# Patient Record
Sex: FEMALE | Race: WHITE | NOT HISPANIC OR LATINO | URBAN - NONMETROPOLITAN AREA
[De-identification: names, ages, dates, MRNs, and addresses within clinical notes are randomized per-mention and may not be internally consistent; named-entity substitution may affect disease eponyms.]

---

## 2017-01-04 ENCOUNTER — AMBULATORY - GICH (OUTPATIENT)
Dept: SCHEDULING | Facility: OTHER | Age: 37
End: 2017-01-04

## 2017-04-03 ENCOUNTER — COMMUNICATION - GICH (OUTPATIENT)
Dept: FAMILY MEDICINE | Facility: OTHER | Age: 37
End: 2017-04-03

## 2017-04-03 DIAGNOSIS — Z30.09 ENCOUNTER FOR OTHER GENERAL COUNSELING AND ADVICE ON CONTRACEPTION: ICD-10-CM

## 2017-04-06 ENCOUNTER — OFFICE VISIT - GICH (OUTPATIENT)
Dept: FAMILY MEDICINE | Facility: OTHER | Age: 37
End: 2017-04-06

## 2017-04-06 ENCOUNTER — HISTORY (OUTPATIENT)
Dept: FAMILY MEDICINE | Facility: OTHER | Age: 37
End: 2017-04-06

## 2017-04-06 DIAGNOSIS — F98.8 OTHER SPECIFIED BEHAVIORAL AND EMOTIONAL DISORDERS WITH ONSET USUALLY OCCURRING IN CHILDHOOD AND ADOLESCENCE: ICD-10-CM

## 2017-04-06 DIAGNOSIS — F43.9 REACTION TO SEVERE STRESS: ICD-10-CM

## 2017-04-06 ASSESSMENT — ANXIETY QUESTIONNAIRES
4. TROUBLE RELAXING: MORE THAN HALF THE DAYS
6. BECOMING EASILY ANNOYED OR IRRITABLE: NOT AT ALL
3. WORRYING TOO MUCH ABOUT DIFFERENT THINGS: NEARLY EVERY DAY
2. NOT BEING ABLE TO STOP OR CONTROL WORRYING: MORE THAN HALF THE DAYS
GAD7 TOTAL SCORE: 12
1. FEELING NERVOUS, ANXIOUS, OR ON EDGE: NEARLY EVERY DAY
5. BEING SO RESTLESS THAT IT IS HARD TO SIT STILL: NOT AT ALL
7. FEELING AFRAID AS IF SOMETHING AWFUL MIGHT HAPPEN: MORE THAN HALF THE DAYS

## 2017-05-11 ENCOUNTER — COMMUNICATION - GICH (OUTPATIENT)
Dept: FAMILY MEDICINE | Facility: OTHER | Age: 37
End: 2017-05-11

## 2017-05-11 DIAGNOSIS — F43.9 REACTION TO SEVERE STRESS: ICD-10-CM

## 2017-07-06 ENCOUNTER — OFFICE VISIT - GICH (OUTPATIENT)
Dept: FAMILY MEDICINE | Facility: OTHER | Age: 37
End: 2017-07-06

## 2017-07-06 ENCOUNTER — HISTORY (OUTPATIENT)
Dept: FAMILY MEDICINE | Facility: OTHER | Age: 37
End: 2017-07-06

## 2017-07-06 DIAGNOSIS — F43.9 REACTION TO SEVERE STRESS: ICD-10-CM

## 2017-07-06 DIAGNOSIS — F98.8 OTHER SPECIFIED BEHAVIORAL AND EMOTIONAL DISORDERS WITH ONSET USUALLY OCCURRING IN CHILDHOOD AND ADOLESCENCE: ICD-10-CM

## 2017-07-28 ENCOUNTER — OFFICE VISIT - GICH (OUTPATIENT)
Dept: FAMILY MEDICINE | Facility: OTHER | Age: 37
End: 2017-07-28

## 2017-07-28 ENCOUNTER — COMMUNICATION - GICH (OUTPATIENT)
Dept: FAMILY MEDICINE | Facility: OTHER | Age: 37
End: 2017-07-28

## 2017-07-28 DIAGNOSIS — K52.9 NONINFECTIVE GASTROENTERITIS AND COLITIS: ICD-10-CM

## 2017-08-01 ENCOUNTER — COMMUNICATION - GICH (OUTPATIENT)
Dept: FAMILY MEDICINE | Facility: OTHER | Age: 37
End: 2017-08-01

## 2017-08-04 ENCOUNTER — OFFICE VISIT - GICH (OUTPATIENT)
Dept: FAMILY MEDICINE | Facility: OTHER | Age: 37
End: 2017-08-04

## 2017-08-04 ENCOUNTER — AMBULATORY - GICH (OUTPATIENT)
Dept: SCHEDULING | Facility: OTHER | Age: 37
End: 2017-08-04

## 2017-08-04 DIAGNOSIS — F41.8 OTHER SPECIFIED ANXIETY DISORDERS: ICD-10-CM

## 2017-08-04 DIAGNOSIS — R19.7 DIARRHEA: ICD-10-CM

## 2017-08-04 DIAGNOSIS — R10.9 ABDOMINAL PAIN: ICD-10-CM

## 2017-08-04 ASSESSMENT — PATIENT HEALTH QUESTIONNAIRE - PHQ9: SUM OF ALL RESPONSES TO PHQ QUESTIONS 1-9: 18

## 2017-08-04 ASSESSMENT — ANXIETY QUESTIONNAIRES
7. FEELING AFRAID AS IF SOMETHING AWFUL MIGHT HAPPEN: MORE THAN HALF THE DAYS
5. BEING SO RESTLESS THAT IT IS HARD TO SIT STILL: NOT AT ALL
GAD7 TOTAL SCORE: 14
6. BECOMING EASILY ANNOYED OR IRRITABLE: SEVERAL DAYS
2. NOT BEING ABLE TO STOP OR CONTROL WORRYING: NEARLY EVERY DAY
3. WORRYING TOO MUCH ABOUT DIFFERENT THINGS: NEARLY EVERY DAY
1. FEELING NERVOUS, ANXIOUS, OR ON EDGE: NEARLY EVERY DAY
4. TROUBLE RELAXING: MORE THAN HALF THE DAYS

## 2017-09-05 ENCOUNTER — OFFICE VISIT - GICH (OUTPATIENT)
Dept: FAMILY MEDICINE | Facility: OTHER | Age: 37
End: 2017-09-05

## 2017-09-05 ENCOUNTER — HISTORY (OUTPATIENT)
Dept: FAMILY MEDICINE | Facility: OTHER | Age: 37
End: 2017-09-05

## 2017-09-05 DIAGNOSIS — F41.8 OTHER SPECIFIED ANXIETY DISORDERS: ICD-10-CM

## 2017-09-05 DIAGNOSIS — R19.7 DIARRHEA: ICD-10-CM

## 2017-09-05 ASSESSMENT — PATIENT HEALTH QUESTIONNAIRE - PHQ9: SUM OF ALL RESPONSES TO PHQ QUESTIONS 1-9: 7

## 2017-10-03 ENCOUNTER — HISTORY (OUTPATIENT)
Dept: FAMILY MEDICINE | Facility: OTHER | Age: 37
End: 2017-10-03

## 2017-10-03 ENCOUNTER — OFFICE VISIT - GICH (OUTPATIENT)
Dept: FAMILY MEDICINE | Facility: OTHER | Age: 37
End: 2017-10-03

## 2017-10-03 DIAGNOSIS — F98.8 OTHER SPECIFIED BEHAVIORAL AND EMOTIONAL DISORDERS WITH ONSET USUALLY OCCURRING IN CHILDHOOD AND ADOLESCENCE: ICD-10-CM

## 2017-10-03 DIAGNOSIS — F43.9 REACTION TO SEVERE STRESS: ICD-10-CM

## 2017-10-03 ASSESSMENT — PATIENT HEALTH QUESTIONNAIRE - PHQ9: SUM OF ALL RESPONSES TO PHQ QUESTIONS 1-9: 7

## 2017-10-13 LAB
6-MONOACETYL MORPHINE - HISTORICAL: ABNORMAL NG/ML
AMPHETAMINE URINE - HISTORICAL: ABNORMAL NG/ML
BARBITURATE URINE - HISTORICAL: ABNORMAL NG/ML
BENZODIAZEPINE URINE - HISTORICAL: ABNORMAL NG/ML
BUPRENORPHRINE URINE - HISTORICAL: ABNORMAL NG/ML
COCAINE METAB URINE - HISTORICAL: ABNORMAL NG/ML
CREAT UR - HISTORICAL: 84 MG/DL
ETHYLGLUCURONIDE URINE - HISTORICAL: ABNORMAL NG/ML
FENTANYL URINE - HISTORICAL: ABNORMAL NG/ML
MASS SPECTROMETRY URINE - HISTORICAL: ABNORMAL
METHADONE URINE - HISTORICAL: ABNORMAL NG/ML
OPIATES URINE - HISTORICAL: ABNORMAL NG/ML
OXYCODONE URINE - HISTORICAL: ABNORMAL NG/ML
PH URINE - HISTORICAL: 7.2
PROPOXYPHENE URINE - HISTORICAL: ABNORMAL NG/ML
THC 50 URINE - HISTORICAL: ABNORMAL NG/ML
TRAMADOL - HISTORICAL: ABNORMAL NG/ML

## 2017-12-28 NOTE — TELEPHONE ENCOUNTER
Patient Information     Patient Name MRN Ava Cardozo [id number] Female 1980      Telephone Encounter by Lon Irby LPN Student at 2017  2:07 PM     Author:  Lon Irby LPN Student Service:  (none) Author Type:  NURS- Student Practical Nurse     Filed:  2017  2:08 PM Encounter Date:  2017 Status:  Signed     :  Lon Irby LPN Student (NURS- Student Practical Nurse)            Pt would like a work in with PCP to fill out her leave of absence paperwork for work. Please call her back.    Lon Irby, ... 2017  [id number]

## 2017-12-28 NOTE — TELEPHONE ENCOUNTER
Patient Information     Patient Name MRN Sex Ava Lott [id number] Female 1980      Telephone Encounter by Kip Starks at 2017 10:08 AM     Author:  Kip Starks Service:  (none) Author Type:  NURS- Student Nurse     Filed:  2017 10:15 AM Encounter Date:  2017 Status:  Signed     :  Kip Starks            After last name and birth date was verified, I spoke with Ava and let her know that there are no work in appointments today with Sirisha Sarah MD. I let her know that she could try to make a same day appointment with a different provider today or be seen in the ER. Transferred her to reception to make an appointment.     Kip Starks ....................  2017   10:14 AM

## 2017-12-28 NOTE — TELEPHONE ENCOUNTER
Patient Information     Patient Name MRN Ava Cardozo [id number] Female 1980      Telephone Encounter by Dang Dempsey at 2017  4:43 PM     Author:  Dang Dempsey Service:  (none) Author Type:  (none)     Filed:  2017  4:43 PM Encounter Date:  2017 Status:  Signed     :  Dang Dempsey            Patient will come in Friday at 1:15 .  Dang Dempsey LPN ....................2017  4:43 PM

## 2017-12-28 NOTE — PROGRESS NOTES
Patient Information     Patient Name MRN Sex Ava Lott [id number] Female 1980      Progress Notes by Sirisha Sarah MD at 2017  3:30 PM     Author:  Sirisha Sarah MD Service:  (none) Author Type:  Physician     Filed:  2017  5:54 PM Encounter Date:  2017 Status:  Signed     :  Sirisha Sarah MD (Physician)            SUBJECTIVE:    Ava Mendoza is a 37 y.o. female who presents for follow-up. 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 Today was the first day of school. She states that although she was a little sad to not be returning to work this year, she had an overwhelming sense of relief as well. She was able to be with her children this morning to enjoy their first day getting them settled in the classrooms. She has not yet gone for any counseling, but now that they are in school and she has more flexibility with her schedule she is planning to schedule that appointment. She also has had her  agreed to see someone for counseling himself as well as with her for couples counseling as well. With the improvement in her stress, her diarrhea and abdominal pain has improved significantly. Last week she was on vacation to visit her family in [place]. She did not need to take the Levsin at all last week. Her stools are becoming more formed and she is having less problems with abdominal pain. She is also regained the weight that she had lost. Since her stools have now become formed again, she has not completed the stool studies.    HPI  I personally reviewed medications/allergies/history listed below:     No Known Allergies,   Family History       Problem   Relation Age of Onset     Diabetes  Father      hyperlipidemia, heart disease on his side of family.       Diabetes  Brother      Heart Disease  Paternal Grandfather      Good Health  Mother       Good Health  Brother      Good Health  Sister    ,   Current Outpatient Prescriptions on File Prior to Visit       Medication  Sig Dispense Refill     FLUoxetine (PROZAC) 20 mg capsule Take 1 capsule by mouth every morning. 90 capsule 3     hyoscyamine (LEVSIN) 0.125 mg tablet Take 1 tablet by mouth every 4 hours if needed for Other (Specify) (abdominal pain). 30 tablet 2     methylphenidate (RITALIN) 10 mg tablet Take 1 tablet by mouth 2 times daily  Take with 20 mg tablet to equal 30 mg twice daily. 60 tablet 0     methylphenidate (RITALIN) 20 mg tablet Take 1 tablet by mouth 2 times daily  Take with 10 mg tablet to equal 30 mg twice daily.  Dx: 314.01 ADHD 60 tablet 0     MONONESSA 0.25-35 mg-mcg tablet TAKE 1 TABLET BY MOUTH EVERY DAY 84 tablet 2     multivitamin (MVI) tablet Take 1 tablet by mouth once daily.  0     nitrofurantoin macrocrystals/monohydrate (MACROBID) 100 mg capsule Take 1 capsule by mouth once daily. 30 capsule 11     No current facility-administered medications on file prior to visit.    ,   Past Medical History:     Diagnosis  Date     History of pregnancy     , gest DM with first pregnancy, vaginal deliveries    ,   Patient Active Problem List     Diagnosis  Code     Recurrent UTI N39.0     ADD (attention deficit disorder) F98.8     High risk medication use - signed 16 Z79.899    and   Past Surgical History:      Procedure  Laterality Date     INGUINAL HERNIA REPAIR BILATERAL      bilat inguinal hernia repair       NASAL SURGERY      nasal reconstruction       Social History     Social History        Marital status:       Spouse name: N/A     Number of children:  N/A     Years of education:  N/A     Occupational History      Not on file.     Social History Main Topics         Smoking status:  Never Smoker      Smokeless tobacco:  Never Used      Alcohol use  4.2 - 6.0 oz/week     7 - 10 Glasses of wine per week      Drug use:  No      Sexual activity:  Yes       "Partners: Male      Birth control/ protection: Condom, OCP      Other Topics  Concern     Not on file      Social History Narrative     .  She is an .  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  She grew up in [place].  She and her  both went to [employer] [employer].  Moved to [place] from [place].      Mt -     Elayne - daughter - 4/17/2011    Dhruv - son - 1/4/2008                    REVIEW OF SYSTEMS:  Review of Systems   Constitutional: Negative for chills, fever and weight loss.   Gastrointestinal: Negative for abdominal pain and diarrhea.   Psychiatric/Behavioral: Positive for depression. Negative for suicidal ideas.       OBJECTIVE:  /80  Pulse 72  Ht 1.676 m (5' 6\")  Wt 57.1 kg (125 lb 12.8 oz)  BMI 20.3 kg/m2    EXAM:   Physical Exam   Constitutional: She is well-developed, well-nourished, and in no distress.   HENT:   Head: Normocephalic.   Eyes: Pupils are equal, round, and reactive to light.   Neck: Normal range of motion. Neck supple. No tracheal deviation present. No thyromegaly present.   Cardiovascular: Normal rate, regular rhythm and normal heart sounds.    No murmur heard.  Pulmonary/Chest: Effort normal and breath sounds normal. No respiratory distress. She has no wheezes. She has no rales.   Musculoskeletal: She exhibits no edema.   Lymphadenopathy:     She has no cervical adenopathy.   Psychiatric: Affect normal.        PHQ Depression Screening 8/4/2017 9/5/2017   Date of PHQ exam (doc flow) - -   1. Lack of interest/pleasure 3 - Nearly every day 1 - Several days   2. Feeling down/depressed 3 - Nearly every day 1 - Several days   PHQ-2 TOTAL SCORE 6 2   3. Trouble sleeping 3 - Nearly every day 1 - Several days   4. Decreased energy 2 - More than half the days 1 - Several days   5. Appetite change 3 - Nearly every day 1 - Several days   6. Feelings of failure 0 - Not at all 0 - Not at all   7. " Trouble concentrating 3 - Nearly every day 1 - Several days   8. Activity level 1 - Several days 1 - Several days   9. Hurting yourself 0 - Not at all 0 - Not at all   PHQ-9 TOTAL SCORE 18 7   PHQ-9 Severity Level moderately severe mild   Functional Impairment very difficult somewhat difficult   Some recent data might be hidden         ERICKA-7 ANXIETY SCREENING 4/6/2017 8/4/2017   ERICKA date (doc flow) 4/6/2017 -   Nervous, anxious 3 3   Cannot stop worrying 2 3   Worry about different things 3 3   Cannot relax 2 2   Feeling restless 0 0   Easily annoyed/irritated 0 1   Afraid of awful event 2 2   Score 12 14   Severity moderate anxiety moderate anxiety   Some recent data might be hidden           ASSESSMENT/PLAN:    ICD-10-CM    1. Diarrhea, unspecified type R19.7    2. Depression with anxiety F41.8         Plan:    1. This is resolving. Likely related to irritable bowel syndrome related to tremendous stress that she has been under. If the diarrhea returns, would recommend completing stool studies.  2. Improved from last visit. She still has a moderate amount of anxiety, but she feels this is more  manageable now. As above, she plans to start counseling. She will be following up with me in about a month to refill her Ritalin and will check and with her again at that time.  Sirisha Sarah MD

## 2017-12-28 NOTE — TELEPHONE ENCOUNTER
Patient Information     Patient Name MRN Ava Cardozo [id number] Female 1980      Telephone Encounter by Sirisha Sarah MD at 2017  4:17 PM     Author:  Sirisha Sarah MD Service:  (none) Author Type:  Physician     Filed:  2017  4:17 PM Encounter Date:  2017 Status:  Signed     :  Sirisha Sarah MD (Physician)            Ok to offer her an approval spot sometime in the next week to fill this out.  15 minutes ok.  Sirisha Sarah MD ....................  2017   4:17 PM

## 2017-12-28 NOTE — TELEPHONE ENCOUNTER
Patient Information     Patient Name MRN Ava Cardozo [id number] Female 1980      Telephone Encounter by Dang Dempsey at 2017  3:09 PM     Author:  Dang Dempsey Service:  (none) Author Type:  (none)     Filed:  2017  3:10 PM Encounter Date:  2017 Status:  Signed     :  Dang Dempsey            How much time needed for this appointment and can this be a work in or next available ?  Dang Dempsey LPN ....................2017  3:10 PM]

## 2017-12-28 NOTE — PROGRESS NOTES
"Patient Information     Patient Name MRN Sex Ava Lott [id number] Female 1980      Progress Notes by David Solano MD at 2017 11:30 AM     Author:  David Solano MD Service:  (none) Author Type:  Physician     Filed:  2017  3:22 PM Encounter Date:  2017 Status:  Signed     :  David Solano MD (Physician)            SUBJECTIVE:    Ava Mendoza is a 37 y.o. female who presents for abdominal pain    HPI Comments: Patient arrives here for abdominal epigastric pain. She reports is associated with gas and bloating. Diarrhea stools. No blood or mucus associated with her diarrhea stools. She denies any traveling. No recent antibiotic use. No camping. She is concerned about an infection and was wondering if whether she needs an antibiotic. States that she is \"struggling with digestion\". She has taken Tums with some relief.      No Known Allergies,   Family History       Problem   Relation Age of Onset     Diabetes  Father      hyperlipidemia, heart disease on his side of family.       Diabetes  Brother      Heart Disease  Paternal Grandfather      Good Health  Mother      Good Health  Brother      Good Health  Sister     and   Current Outpatient Prescriptions on File Prior to Visit       Medication  Sig Dispense Refill     FLUoxetine (PROZAC) 20 mg capsule Take 1 capsule by mouth every morning. 90 capsule 3     methylphenidate (RITALIN) 10 mg tablet Take 1 tablet by mouth 2 times daily  Take with 20 mg tablet to equal 30 mg twice daily. 60 tablet 0     methylphenidate (RITALIN) 20 mg tablet Take 1 tablet by mouth 2 times daily  Take with 10 mg tablet to equal 30 mg twice daily.  Dx: 314.01 ADHD 60 tablet 0     MONONESSA 0.25-35 mg-mcg tablet TAKE 1 TABLET BY MOUTH EVERY DAY 84 tablet 2     multivitamin (MVI) tablet Take 1 tablet by mouth once daily.  0     nitrofurantoin macrocrystals/monohydrate (MACROBID) 100 mg capsule Take 1 capsule by mouth once daily. 30 " "capsule 11     No current facility-administered medications on file prior to visit.        REVIEW OF SYSTEMS:  ROS    OBJECTIVE:  /66  Temp 99.1  F (37.3  C) (Temporal)  Ht 1.65 m (5' 4.96\")  Wt 53.5 kg (118 lb)  LMP 07/03/2017  Breastfeeding? No  BMI 19.66 kg/m2    EXAM:   Physical Exam   Constitutional: She is well-developed, well-nourished, and in no distress.   Cardiovascular: Normal rate and regular rhythm.    Pulmonary/Chest: Effort normal and breath sounds normal.   Abdominal: Soft. There is tenderness (slight amount in the epigastric).   Slightly hyperactive bowel sounds       ASSESSMENT/PLAN:    ICD-10-CM    1. Gastroenteritis K52.9         Plan:  Start Prilosec. Discussed that the diarrhea should improve over the next few days. If not would consider C. difficile. At this time would not check for H. pylori unless symptoms persist.          "

## 2017-12-28 NOTE — PROGRESS NOTES
Patient Information     Patient Name MRN Sex Ava Lott [id number] Female 1980      Progress Notes by Sirisha Sarah MD at 10/3/2017  4:15 PM     Author:  Sirisha Sarah MD Service:  (none) Author Type:  Physician     Filed:  10/4/2017  9:27 AM Encounter Date:  10/3/2017 Status:  Signed     :  Sirisha Sarah MD (Physician)            SUBJECTIVE:    Ava Mendoza is a 37 y.o. female who presents for follow up of her ADD and stress reaction/depression/anxiety.  She states that with the start of school and the changes with her 's business ventures, she underestimated how much people were going to be questioning her when she is out in public.  She states that even a trip to the dentist ends up as an interrogation with multiple questions.  Having to go through this repeatedly has been stressful for her.  Although the prozac has been helpful, she feels that it is not fully taking care of her symptoms.  She states that at least 2-3 days of the 5 day school week when her kids are occupied, she has a very hard time motivating herself to take care of the things that need to be done around the house.  She still feels that the methylphenidate helps her focus to be more productive.    HPI  I personally reviewed medications/allergies/history listed below:      No Known Allergies,   Family History       Problem   Relation Age of Onset     Diabetes  Father      hyperlipidemia, heart disease on his side of family.       Diabetes  Brother      Heart Disease  Paternal Grandfather      Good Health  Mother      Good Health  Brother      Good Health  Sister    ,   Current Outpatient Prescriptions on File Prior to Visit       Medication  Sig Dispense Refill     hyoscyamine (LEVSIN) 0.125 mg tablet Take 1 tablet by mouth every 4 hours if needed for Other (Specify) (abdominal pain). 30 tablet 2     multivitamin (MVI) tablet Take 1 tablet by mouth once daily.  0     nitrofurantoin  "macrocrystals/monohydrate (MACROBID) 100 mg capsule Take 1 capsule by mouth once daily. 30 capsule 11     No current facility-administered medications on file prior to visit.    ,   Past Medical History:     Diagnosis  Date     History of pregnancy     , gest DM with first pregnancy, vaginal deliveries    ,   Patient Active Problem List     Diagnosis  Code     Recurrent UTI N39.0     ADD (attention deficit disorder) F98.8     High risk medication use - signed 16 Z79.899    and   Past Surgical History:      Procedure  Laterality Date     INGUINAL HERNIA REPAIR BILATERAL      bilat inguinal hernia repair       NASAL SURGERY      nasal reconstruction       Social History     Social History        Marital status:       Spouse name: N/A     Number of children:  N/A     Years of education:  N/A     Occupational History      Not on file.     Social History Main Topics         Smoking status:  Never Smoker      Smokeless tobacco:  Never Used      Alcohol use  4.2 - 6.0 oz/week     7 - 10 Glasses of wine per week      Drug use:  No      Sexual activity:  Yes      Partners: Male      Birth control/ protection: Condom, OCP      Other Topics  Concern     Not on file      Social History Narrative     .  She is an .  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  She grew up in [place].  She and her  both went to [employer].  Moved to [place] from [place].      Mt -     Elayne - daughter - 2011    Dhruv - son - 2008                  REVIEW OF SYSTEMS:  Review of Systems   Constitutional: Negative for weight loss.   Gastrointestinal: Positive for diarrhea (intermittently with increased stress.).   Psychiatric/Behavioral: Positive for depression. Negative for suicidal ideas.       OBJECTIVE:  /64  Pulse 74  Ht 1.676 m (5' 6\")  Wt 56.2 kg (124 lb)  LMP 2017  BMI 20.01 kg/m2    EXAM:   Physical " Exam   Constitutional: She is well-developed, well-nourished, and in no distress.   HENT:   Head: Normocephalic.   Eyes: Pupils are equal, round, and reactive to light.   Neck: Normal range of motion. Neck supple. No thyromegaly present.   Cardiovascular: Normal rate, regular rhythm and normal heart sounds.    Pulmonary/Chest: Effort normal and breath sounds normal. No respiratory distress. She has no wheezes. She has no rales.   Abdominal: Soft. Bowel sounds are normal. She exhibits no distension. There is no tenderness. There is no rebound and no guarding.   Lymphadenopathy:     She has no cervical adenopathy.   Psychiatric: Affect normal.            PHQ Depression Screening 9/5/2017 10/3/2017   Date of PHQ exam (doc flow) - -   1. Lack of interest/pleasure 1 - Several days 1 - Several days   2. Feeling down/depressed 1 - Several days 1 - Several days   PHQ-2 TOTAL SCORE 2 2   3. Trouble sleeping 1 - Several days 0 - Not at all   4. Decreased energy 1 - Several days 1 - Several days   5. Appetite change 1 - Several days 1 - Several days   6. Feelings of failure 0 - Not at all 1 - Several days   7. Trouble concentrating 1 - Several days 2 - More than half the days   8. Activity level 1 - Several days 0 - Not at all   9. Hurting yourself 0 - Not at all 0 - Not at all   PHQ-9 TOTAL SCORE 7 7   PHQ-9 Severity Level mild mild   Functional Impairment somewhat difficult somewhat difficult   Some recent data might be hidden       ERICKA-7 ANXIETY SCREENING 4/6/2017 8/4/2017   ERICKA date (doc flow) 4/6/2017 -   Nervous, anxious 3 3   Cannot stop worrying 2 3   Worry about different things 3 3   Cannot relax 2 2   Feeling restless 0 0   Easily annoyed/irritated 0 1   Afraid of awful event 2 2   Score 12 14   Severity moderate anxiety moderate anxiety   Some recent data might be hidden          ASSESSMENT/PLAN:    ICD-10-CM    1. Attention deficit disorder, unspecified hyperactivity presence F98.8 methylphenidate HCl (RITALIN) 10  mg tablet      methylphenidate HCl (RITALIN) 10 mg tablet      methylphenidate HCl (RITALIN) 10 mg tablet      methylphenidate HCl (RITALIN) 20 mg tablet      methylphenidate HCl (RITALIN) 20 mg tablet      methylphenidate HCl (RITALIN) 20 mg tablet      COMPLIANCE DRUG ANALYSIS      COMPLIANCE DRUG ANALYSIS   2. Stress F43.9 FLUoxetine (PROZAC) 40 mg capsule        Plan:    1.   website printout reviewed and scanned today.  Methylphenidate 10 mg #60 x 3 and 20 mg #60 x 3 refilled today.  Updated contract completed.  Updated toxassure sent.  Follow up in 3 months.  2.  Increased fluoxetine to 40 mg daily.  She will plan to follow up in 3 months again, but may call to be seen sooner if needed.  Sirisha Sarah MD ....................  10/4/2017   9:26 AM

## 2017-12-28 NOTE — TELEPHONE ENCOUNTER
Patient Information     Patient Name MRN Ava Cardozo [id number] Female 1980      Telephone Encounter by Ashli Chou at 2017  8:16 AM     Author:  Ashli Chou Service:  (none) Author Type:  (none)     Filed:  2017  8:17 AM Encounter Date:  2017 Status:  Signed     :  Ashli Chou            PT REQUESTING WORK IN, STATES STOMACH PAIN

## 2017-12-28 NOTE — PROGRESS NOTES
Patient Information     Patient Name MRN Ava Cardozo [id number] Female 1980      Progress Notes by Sirisha Sarah MD at 2017  1:15 PM     Author:  Sirisha Sarah MD  Service:  (none) Author Type:  Physician     Filed:  2017  1:07 PM  Encounter Date:  2017 Status:  Addendum     :  Sirisha Sarah MD (Physician)        Related Notes: Original Note by Sirisha Sarah MD (Physician) filed at 2017  1:05 PM            SUBJECTIVE:    Ava Mendoza is a 37 y.o. female who presents for follow up.  She continues to have a lot of stress in her life.  Her  Mt has gone through the process of buying an iron mine in [facility].  There was a post on [employer] with a link to an article about this.  There were many comments made about the article that were very negative and very critical of Aav's family.  This has been very embarrassing to her and very distressing.  She has had significant worsening of depression and anxiety.  She has been extremely fatigued.  She is having problems eating.  She has had difficulty sleeping.  She doesn't feel like being around people.  With her  having to travel up to this remote area of [place], he is working on getting his 's license so he will be able to fly himself.  Ava is faced with having to function as a single mother and is overwhelmed with the thought of having to get their kids to activities as well as school daily as well as work and keep up the house.  Mt is on the  school board.  Ava works for the school district as an .  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  She is having a very hard time facing the scrutiny of students as well as community members with him holding a place on the school board as well.  She has no family in the area.  She does have some good friends in the  area, but even their concern is exhausting to her.  She has been having some panic attacks.    Over the past few weeks, she thinks since about 17 she has been having a lot of epigastric pain.  She has been having a lot of diarrhea and abdominal bloating.  She has a lot of nausea.  She has a hard time eating because of this as well.  No blood or mucous in her stools.  Saw Dr. Solano a week ago.  Has tried omeprazole, which really hasn't helped a whole lot.  Wonders if she could have an infection.    HPI  I personally reviewed medications/allergies/history listed below:      No Known Allergies,   Family History       Problem   Relation Age of Onset     Diabetes  Father      hyperlipidemia, heart disease on his side of family.       Diabetes  Brother      Heart Disease  Paternal Grandfather      Good Health  Mother      Good Health  Brother      Good Health  Sister    ,   Current Outpatient Prescriptions on File Prior to Visit       Medication  Sig Dispense Refill     FLUoxetine (PROZAC) 20 mg capsule Take 1 capsule by mouth every morning. 90 capsule 3     methylphenidate (RITALIN) 10 mg tablet Take 1 tablet by mouth 2 times daily  Take with 20 mg tablet to equal 30 mg twice daily. 60 tablet 0     methylphenidate (RITALIN) 20 mg tablet Take 1 tablet by mouth 2 times daily  Take with 10 mg tablet to equal 30 mg twice daily.  Dx: 314.01 ADHD 60 tablet 0     MONONESSA 0.25-35 mg-mcg tablet TAKE 1 TABLET BY MOUTH EVERY DAY 84 tablet 2     multivitamin (MVI) tablet Take 1 tablet by mouth once daily.  0     nitrofurantoin macrocrystals/monohydrate (MACROBID) 100 mg capsule Take 1 capsule by mouth once daily. 30 capsule 11     No current facility-administered medications on file prior to visit.    ,   Past Medical History:     Diagnosis  Date     History of pregnancy     , gest DM with first pregnancy, vaginal deliveries    ,   Patient Active Problem List     Diagnosis  Code     Recurrent UTI N39.0     ADD (attention  "deficit disorder) F98.8     High risk medication use - signed 9/22/16 Z79.899    and   Past Surgical History:      Procedure  Laterality Date     INGUINAL HERNIA REPAIR BILATERAL      bilat inguinal hernia repair       NASAL SURGERY      nasal reconstruction       Social History     Social History        Marital status:       Spouse name: N/A     Number of children:  N/A     Years of education:  N/A     Occupational History      Not on file.     Social History Main Topics         Smoking status:  Never Smoker      Smokeless tobacco:  Never Used      Alcohol use  4.2 - 6.0 oz/week     7 - 10 Glasses of wine per week      Drug use:  No      Sexual activity:  Yes      Partners: Male      Birth control/ protection: Condom, OCP      Other Topics  Concern     Not on file      Social History Narrative     .  She is an .  is president of [employer].  She grew up in [place].  She and her  both went to [employer].  Most recently moved to [place] from [place].  Mt - , Elayne - daughter - 4/17/2011, Dhruv - son - 1/4/2008                    REVIEW OF SYSTEMS:  Review of Systems   Constitutional: Positive for malaise/fatigue and weight loss. Negative for chills and fever.   Gastrointestinal: Positive for abdominal pain, diarrhea and nausea. Negative for blood in stool, constipation, heartburn, melena and vomiting.   Psychiatric/Behavioral: Positive for depression. Negative for suicidal ideas.   All other systems reviewed and are negative.      OBJECTIVE:  /80  Pulse 74  Ht 1.676 m (5' 6\")  Wt 53.5 kg (118 lb)  LMP 07/03/2017  BMI 19.05 kg/m2    EXAM:   Physical Exam   Constitutional: She is well-developed, well-nourished, and in no distress.   HENT:   Head: Normocephalic.   Eyes: Pupils are equal, round, and reactive to light.   Neck: Normal range of motion. Neck supple. No thyromegaly present.   Cardiovascular: Normal rate, regular rhythm and normal " heart sounds.    No murmur heard.  Pulmonary/Chest: Effort normal and breath sounds normal. No respiratory distress. She has no wheezes. She has no rales.   Abdominal: Soft. Bowel sounds are normal. She exhibits no distension and no mass. There is no tenderness. There is no rebound and no guarding.   Musculoskeletal: She exhibits no edema.   Lymphadenopathy:     She has no cervical adenopathy.   Psychiatric:   Depressed affect.  Tearful at times.       PHQ Depression Screening 7/6/2017 8/4/2017   Date of PHQ exam (doc flow) 7/6/2017 -   1. Lack of interest/pleasure 0 - Not at all 3 - Nearly every day   2. Feeling down/depressed 0 - Not at all 3 - Nearly every day   PHQ-2 TOTAL SCORE 0 6   3. Trouble sleeping - 3 - Nearly every day   4. Decreased energy - 2 - More than half the days   5. Appetite change - 3 - Nearly every day   6. Feelings of failure - 0 - Not at all   7. Trouble concentrating - 3 - Nearly every day   8. Activity level - 1 - Several days   9. Hurting yourself - 0 - Not at all   PHQ-9 TOTAL SCORE - 18   PHQ-9 Severity Level - moderately severe   Functional Impairment - very difficult   Some recent data might be hidden       ERICKA-7 ANXIETY SCREENING 4/6/2017 8/4/2017   ERICKA date (doc flow) 4/6/2017 -   Nervous, anxious 3 3   Cannot stop worrying 2 3   Worry about different things 3 3   Cannot relax 2 2   Feeling restless 0 0   Easily annoyed/irritated 0 1   Afraid of awful event 2 2   Score 12 14   Severity moderate anxiety moderate anxiety   Some recent data might be hidden          ASSESSMENT/PLAN:    ICD-10-CM    1. Depression with anxiety F41.8 AMB CONSULT TO OTHER MENTAL HEALTH   2. Abdominal pain, unspecified location R10.9 hyoscyamine (LEVSIN) 0.125 mg tablet   3. Diarrhea, unspecified type R19.7 CLOSTRIDIUM DIFFICILE TOXIN PCR      GIARDIA ANTIGEN      STOOL CULTURE PANEL      OVA / PARASITE EXAM      WBC LACTOFERRIN GIH FLH DONA ONLY        Plan:    1.  Not well controlled.  This is very  situational at this time.  Referred for counseling.  Continue on current dose of prozac.  Follow up in approximately 3 weeks.  Paperwork filled out allowing her to take a leave of absence from work for this school year.  Her employer requested that she do this for an entire year due to her issues as well as with having her  hold a place on the school board.  2.  This is likely due to stress/IBS.  Trial of levsin for symptomatic relief.  Follow up as above.  3.  Will do stool studies to rule out an infectious cause.  Discussed that if this continues, could consider colonoscopy, but I do feel that if her stress and mood improved, her diarrhea would likely resolve.  Sirisha Sarah MD ....................  8/5/2017   1:05 PM

## 2017-12-28 NOTE — PROGRESS NOTES
Patient Information     Patient Name MRN Ava Cardozo [id number] Female 1980      Progress Notes by Sirisha Sraah MD at 2017  3:45 PM     Author:  Sirisha Sarah MD Service:  (none) Author Type:  Physician     Filed:  2017  6:06 PM Encounter Date:  2017 Status:  Signed     :  Sirisha Sarah MD (Physician)            SUBJECTIVE:    Ava Mendoza is a 37 y.o. female who presents for medication refills.  She had hoped to be able to stop the methylphenidate over the summer while she is off of work as a .  However, she states that she is just able to function much better when she is taking it and therefore has been taking it on a regular basis.  She wonders if she would be able to take 30 mg twice daily as she thinks that it would help her better.  She is currently on 20 mg twice daily, which is not completely effective.  She states that some of her stress has improved since she was here last.  Her  and his family are moving forth with plans to buy an iron ore mine in [facility].  However, she and their kids plan to remain in this area and will not relocate with him.  Instead, he is working towards getting his 's license so that he will be able to travel more easily back and forth to see them.  She feels that the prozac has helped immensely with managing her stress levels, but wonders if she would be able to increase her dose slightly.    HPI  I personally reviewed medications/allergies/history listed below:      No Known Allergies,   Family History       Problem   Relation Age of Onset     Diabetes  Father      hyperlipidemia, heart disease on his side of family.       Diabetes  Brother      Heart Disease  Paternal Grandfather      Good Health  Mother      Good Health  Brother      Good Health  Sister    ,   Current Outpatient Prescriptions on File Prior to Visit       Medication  Sig Dispense Refill     MONONESSA  0.25-35 mg-mcg tablet TAKE 1 TABLET BY MOUTH EVERY DAY 84 tablet 2     multivitamin (MVI) tablet Take 1 tablet by mouth once daily.  0     nitrofurantoin macrocrystals/monohydrate (MACROBID) 100 mg capsule Take 1 capsule by mouth once daily. 30 capsule 11     No current facility-administered medications on file prior to visit.    ,   Past Medical History:     Diagnosis  Date     History of pregnancy     , gest DM with first pregnancy, vaginal deliveries    ,   Patient Active Problem List     Diagnosis  Code     Recurrent UTI N39.0     ADD (attention deficit disorder) F98.8     High risk medication use - signed 16 Z79.899    and   Past Surgical History:      Procedure  Laterality Date     INGUINAL HERNIA REPAIR BILATERAL      bilat inguinal hernia repair       NASAL SURGERY      nasal reconstruction       Social History     Social History        Marital status:       Spouse name: N/A     Number of children:  N/A     Years of education:  N/A     Occupational History      Not on file.     Social History Main Topics         Smoking status:  Never Smoker      Smokeless tobacco:  Never Used      Alcohol use  4.2 - 6.0 oz/week     7 - 10 Glasses of wine per week      Drug use:  No      Sexual activity:  Yes      Partners: Male      Birth control/ protection: Condom, OCP      Other Topics  Concern     Not on file      Social History Narrative     .  She is an .  is president of [employer].  She grew up in [place].  She and her  both went to [facility].  Most recently moved to [place] from [place].  Mt - , Elayne - daughter - 2011, Dhruv - son - 2008                    REVIEW OF SYSTEMS:  Review of Systems   All other systems reviewed and are negative.      OBJECTIVE:  /88  Pulse 80  Wt 55.8 kg (123 lb)  LMP 2017  Breastfeeding? No  BMI 20.47 kg/m2    EXAM:   Physical Exam   Constitutional: She is well-developed,  well-nourished, and in no distress.   HENT:   Head: Normocephalic.   Eyes: Pupils are equal, round, and reactive to light.   Neck: Normal range of motion. Neck supple. No thyromegaly present.   Cardiovascular: Normal rate, regular rhythm and normal heart sounds.    No murmur heard.  Pulmonary/Chest: Effort normal and breath sounds normal. No respiratory distress. She has no wheezes. She has no rales.   Lymphadenopathy:     She has no cervical adenopathy.   Psychiatric: Affect normal.   PHQ Depression Screen  Date of PHQ exam: 07/06/17  Over the last 2 weeks, how often have you been bothered by any of the following problems?  1. Little interest or pleasure in doing things: 0 - Not at all  2. Feeling down, depressed, or hopeless: 0 - Not at all                                            ASSESSMENT/PLAN:    ICD-10-CM    1. ADD (attention deficit disorder) F98.8 methylphenidate (RITALIN) 10 mg tablet      methylphenidate (RITALIN) 20 mg tablet      DISCONTINUED: methylphenidate (RITALIN) 20 mg tablet      DISCONTINUED: methylphenidate (RITALIN) 10 mg tablet      DISCONTINUED: methylphenidate (RITALIN) 20 mg tablet      DISCONTINUED: methylphenidate (RITALIN) 10 mg tablet   2. Stress F43.9 FLUoxetine (PROZAC) 20 mg capsule        Plan:    1.  Increase dose of methylphenidate to 30 mg twice daily.  She will need to take 20 mg + 10 mg tablets twice daily to achieve this.  #60 of each dose x 3 given today.  Last toxassure on 5/3/16 noted after patient left.  Will plan to recheck this at her next medication refill visit.  Last contract signed on 9/22/16.  She will also be due to sign update at next visit.  [facility] website printout reviewed and scanned today.  2.  Increase dose of fluoxetine slightly to 20 mg daily.  Recheck in 3 months.  May follow up sooner if needed.  Sirisha Sarah MD ....................  7/6/2017   6:06 PM

## 2017-12-30 NOTE — NURSING NOTE
Patient Information     Patient Name MRN Ava Cardozo [id number] Female 1980      Nursing Note by Sherley Diamond at 2017 11:30 AM     Author:  Sherley Diamond Service:  (none) Author Type:  (none)     Filed:  2017  2:02 PM Encounter Date:  2017 Status:  Signed     :  Sherley Diamond            Patient presents to the clinic with nausea and diarrhea multiple times per day.  Sherley Diamond LPN....................2017 11:55 AM

## 2017-12-30 NOTE — NURSING NOTE
Patient Information     Patient Name MRN Ava Cardozo [id number] Female 1980      Nursing Note by Carmen Ramirez at 2017  3:45 PM     Author:  Carmen Ramirez Service:  (none) Author Type:  (none)     Filed:  2017  4:05 PM Encounter Date:  2017 Status:  Signed     :  Carmen Ramirez            Patient is here today for a 3 month med check, she needs refills on Prozac and Ritalin, she would like to talk to Sirisha Sarah MD about increasing the dose of each. Carmen Ramirez LPN......................2017 3:53 PM

## 2018-01-04 ENCOUNTER — HISTORY (OUTPATIENT)
Dept: FAMILY MEDICINE | Facility: OTHER | Age: 38
End: 2018-01-04

## 2018-01-04 ENCOUNTER — OFFICE VISIT - GICH (OUTPATIENT)
Dept: FAMILY MEDICINE | Facility: OTHER | Age: 38
End: 2018-01-04

## 2018-01-04 DIAGNOSIS — F98.8 OTHER SPECIFIED BEHAVIORAL AND EMOTIONAL DISORDERS WITH ONSET USUALLY OCCURRING IN CHILDHOOD AND ADOLESCENCE: ICD-10-CM

## 2018-01-04 ASSESSMENT — ANXIETY QUESTIONNAIRES
7. FEELING AFRAID AS IF SOMETHING AWFUL MIGHT HAPPEN: SEVERAL DAYS
1. FEELING NERVOUS, ANXIOUS, OR ON EDGE: SEVERAL DAYS
5. BEING SO RESTLESS THAT IT IS HARD TO SIT STILL: NOT AT ALL
GAD7 TOTAL SCORE: 6
4. TROUBLE RELAXING: SEVERAL DAYS
2. NOT BEING ABLE TO STOP OR CONTROL WORRYING: SEVERAL DAYS
6. BECOMING EASILY ANNOYED OR IRRITABLE: SEVERAL DAYS
3. WORRYING TOO MUCH ABOUT DIFFERENT THINGS: SEVERAL DAYS

## 2018-01-04 ASSESSMENT — PATIENT HEALTH QUESTIONNAIRE - PHQ9: SUM OF ALL RESPONSES TO PHQ QUESTIONS 1-9: 6

## 2018-01-04 NOTE — TELEPHONE ENCOUNTER
Patient Information     Patient Name MRN Ava Cardozo [id number] Female 1980      Telephone Encounter by Paris Brown RN at 2017  9:36 AM     Author:  Paris Brown RN Service:  (none) Author Type:  NURS- Registered Nurse     Filed:  2017  9:44 AM Encounter Date:  4/3/2017 Status:  Signed     :  Paris Brown RN (NURS- Registered Nurse)            Hormones  Office visit in the past 12 months or per provider note.  Last visit with JUAN ARTEAGA was on: 2016 in [facility]-Physical on 16-pap smear 16  Next visit with JUAN ARTEAGA is on: 2017 in [facility]  Next visit with Family Practice is on: 2017 in [facility]  Max refill for 12 months from last office visit or per provider note.  Prescription refilled per RN Medication Refill Policy.................... Paris Brown RN ....................  2017   9:41 AM
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
## 2018-01-05 NOTE — TELEPHONE ENCOUNTER
Patient Information     Patient Name MRN Ava Cardozo [id number] Female 1980      Telephone Encounter by Anna Armas at 2017  4:31 PM     Author:  Anna Armas Service:  (none) Author Type:  (none)     Filed:  2017  4:33 PM Encounter Date:  2017 Status:  Signed     :  Anna Armas            Ava was scheduled to see Dr. Tyrel sebastian (17) afternoon for a 1 month Prozac follow up.  We had to cancel her appointment due to a chance in the provider's schedule.  Patient said that the medication is working great and she is not having any problems with it.  She does need a refill, but was wondering if she really needs a follow up appointment.  Anna Armas ....................  2017   4:33 PM

## 2018-01-05 NOTE — TELEPHONE ENCOUNTER
Patient Information     Patient Name MRN Ava Cardozo [id number] Female 1980      Telephone Encounter by Olya [place] at 2017  9:28 AM     Author:  Olya [place] Service:  (none) Author Type:  (none)     Filed:  2017  9:29 AM Encounter Date:  2017 Status:  Signed     :  Olya [place]            This was sent with 11 refills when she was in the last time.  No refill order is needed.    Please clarify if you want to see her for a follow up on this or just cover it at her next appointment.  Olya [place] CMA (AAMA)................ 2017 9:29 AM

## 2018-01-05 NOTE — TELEPHONE ENCOUNTER
Patient Information     Patient Name MRN Ava Cardozo [id number] Female 1980      Telephone Encounter by Olya [place] at 2017  8:56 AM     Author:  Olya [place] Service:  (none) Author Type:  (none)     Filed:  2017  8:56 AM Encounter Date:  2017 Status:  Signed     :  Olya [place]            Left detailed message for patient regarding her prescription and when she needs to be seen.  Olya [place] CMA (AAMA)................ 2017 8:56 AM

## 2018-01-05 NOTE — TELEPHONE ENCOUNTER
Patient Information     Patient Name MRN Ava Cardozo [id number] Female 1980      Telephone Encounter by Sirisha Sarah MD at 2017 12:16 PM     Author:  Sirisha Sarah MD Service:  (none) Author Type:  Physician     Filed:  2017 12:17 PM Encounter Date:  2017 Status:  Signed     :  Sirisha Sarah MD (Physician)            I refilled the fluoxetine for #90 with 1 year of refills.  If she is doing well, she doesn't need to come back right now.  The door is open if she needs to come back at any time prior to being due for refill of her other medications!  Sirisha Sarah MD ....................  2017   12:17 PM

## 2018-01-05 NOTE — TELEPHONE ENCOUNTER
Patient Information     Patient Name MRN Ava Cardozo [id number] Female 1980      Telephone Encounter by Monik Sharpe at 2017  1:59 PM     Author:  Monik Sharpe Service:  (none) Author Type:  (none)     Filed:  2017  2:00 PM Encounter Date:  2017 Status:  Signed     :  Monik Sharpe            Left message for Ava to return call.  Monik Sharpe LPN............................... 2017 2:00 PM

## 2018-01-17 ENCOUNTER — AMBULATORY - GICH (OUTPATIENT)
Dept: FAMILY MEDICINE | Facility: OTHER | Age: 38
End: 2018-01-17

## 2018-01-27 VITALS
BODY MASS INDEX: 19.33 KG/M2 | SYSTOLIC BLOOD PRESSURE: 130 MMHG | WEIGHT: 123 LBS | DIASTOLIC BLOOD PRESSURE: 94 MMHG | SYSTOLIC BLOOD PRESSURE: 118 MMHG | HEIGHT: 65 IN | BODY MASS INDEX: 20.47 KG/M2 | DIASTOLIC BLOOD PRESSURE: 88 MMHG | HEART RATE: 80 BPM | WEIGHT: 116 LBS

## 2018-01-27 VITALS
SYSTOLIC BLOOD PRESSURE: 126 MMHG | BODY MASS INDEX: 18.96 KG/M2 | HEIGHT: 66 IN | HEART RATE: 74 BPM | DIASTOLIC BLOOD PRESSURE: 80 MMHG | WEIGHT: 118 LBS

## 2018-01-27 VITALS
DIASTOLIC BLOOD PRESSURE: 66 MMHG | SYSTOLIC BLOOD PRESSURE: 104 MMHG | WEIGHT: 118 LBS | HEIGHT: 65 IN | SYSTOLIC BLOOD PRESSURE: 110 MMHG | BODY MASS INDEX: 19.66 KG/M2 | BODY MASS INDEX: 19.93 KG/M2 | WEIGHT: 124 LBS | TEMPERATURE: 99.1 F | HEART RATE: 74 BPM | DIASTOLIC BLOOD PRESSURE: 64 MMHG | HEIGHT: 66 IN

## 2018-01-27 VITALS
BODY MASS INDEX: 20.22 KG/M2 | WEIGHT: 125.8 LBS | HEIGHT: 66 IN | DIASTOLIC BLOOD PRESSURE: 80 MMHG | SYSTOLIC BLOOD PRESSURE: 120 MMHG | HEART RATE: 72 BPM

## 2018-01-28 ENCOUNTER — HEALTH MAINTENANCE LETTER (OUTPATIENT)
Age: 38
End: 2018-01-28

## 2018-01-30 ENCOUNTER — OFFICE VISIT - GICH (OUTPATIENT)
Dept: FAMILY MEDICINE | Facility: OTHER | Age: 38
End: 2018-01-30

## 2018-01-30 ENCOUNTER — HISTORY (OUTPATIENT)
Dept: FAMILY MEDICINE | Facility: OTHER | Age: 38
End: 2018-01-30

## 2018-01-30 DIAGNOSIS — N30.00 ACUTE CYSTITIS WITHOUT HEMATURIA: ICD-10-CM

## 2018-01-30 DIAGNOSIS — R39.89 OTHER SYMPTOMS AND SIGNS INVOLVING THE GENITOURINARY SYSTEM: ICD-10-CM

## 2018-01-30 DIAGNOSIS — N39.0 URINARY TRACT INFECTION: ICD-10-CM

## 2018-01-30 LAB
BACTERIA URINE: ABNORMAL BACTERIA/HPF
BILIRUB UR QL: NEGATIVE
CLARITY, URINE: CLEAR CLARITY
COLOR UR: YELLOW COLOR
EPITHELIAL CELLS: ABNORMAL EPI/HPF
GLUCOSE URINE: NEGATIVE MG/DL
KETONES UR QL: NEGATIVE MG/DL
LEUKOCYTE ESTERASE URINE: NEGATIVE
NITRITE UR QL STRIP: POSITIVE
OCCULT BLOOD,URINE - HISTORICAL: NEGATIVE
OTHER: ABNORMAL
PH UR: 6.5 [PH]
PROTEIN QUALITATIVE,URINE - HISTORICAL: NEGATIVE MG/DL
RBC - HISTORICAL: ABNORMAL /HPF
RENAL EPITHELIAL CELLS - HISTORICAL: ABNORMAL
SP GR UR STRIP: 1.02
UROBILINOGEN,QUALITATIVE - HISTORICAL: NORMAL EU/DL
WBC - HISTORICAL: ABNORMAL /HPF

## 2018-02-01 LAB
CULTURE - HISTORICAL: ABNORMAL
CULTURE - HISTORICAL: ABNORMAL
SUSCEPTIBILITY RESULT - HISTORICAL: ABNORMAL

## 2018-02-02 ASSESSMENT — ANXIETY QUESTIONNAIRES
GAD7 TOTAL SCORE: 14
GAD7 TOTAL SCORE: 12

## 2018-02-02 ASSESSMENT — PATIENT HEALTH QUESTIONNAIRE - PHQ9
SUM OF ALL RESPONSES TO PHQ QUESTIONS 1-9: 7
SUM OF ALL RESPONSES TO PHQ QUESTIONS 1-9: 18
SUM OF ALL RESPONSES TO PHQ QUESTIONS 1-9: 7

## 2018-02-05 ENCOUNTER — DOCUMENTATION ONLY (OUTPATIENT)
Dept: FAMILY MEDICINE | Facility: OTHER | Age: 38
End: 2018-02-05

## 2018-02-05 RX ORDER — NITROFURANTOIN 25; 75 MG/1; MG/1
1 CAPSULE ORAL DAILY
COMMUNITY
Start: 2016-08-02

## 2018-02-05 RX ORDER — HYOSCYAMINE SULFATE 0.125 MG
1 TABLET ORAL EVERY 4 HOURS PRN
COMMUNITY
Start: 2017-08-04

## 2018-02-05 RX ORDER — DIPHENOXYLATE HYDROCHLORIDE AND ATROPINE SULFATE 2.5; .025 MG/1; MG/1
1 TABLET ORAL DAILY
COMMUNITY
Start: 2013-03-12

## 2018-02-05 RX ORDER — METHYLPHENIDATE HYDROCHLORIDE 36 MG/1
1 TABLET ORAL 2 TIMES DAILY
COMMUNITY
Start: 2018-03-02 | End: 2018-02-16

## 2018-02-09 ENCOUNTER — DOCUMENTATION ONLY (OUTPATIENT)
Dept: FAMILY MEDICINE | Facility: OTHER | Age: 38
End: 2018-02-09

## 2018-02-09 VITALS
WEIGHT: 140.8 LBS | BODY MASS INDEX: 22.73 KG/M2 | DIASTOLIC BLOOD PRESSURE: 80 MMHG | SYSTOLIC BLOOD PRESSURE: 120 MMHG | HEART RATE: 99 BPM | TEMPERATURE: 97.4 F

## 2018-02-09 VITALS
DIASTOLIC BLOOD PRESSURE: 70 MMHG | WEIGHT: 143 LBS | HEART RATE: 74 BPM | BODY MASS INDEX: 22.98 KG/M2 | SYSTOLIC BLOOD PRESSURE: 120 MMHG | HEIGHT: 66 IN

## 2018-02-10 ASSESSMENT — ANXIETY QUESTIONNAIRES: GAD7 TOTAL SCORE: 6

## 2018-02-10 ASSESSMENT — PATIENT HEALTH QUESTIONNAIRE - PHQ9: SUM OF ALL RESPONSES TO PHQ QUESTIONS 1-9: 6

## 2018-02-12 NOTE — NURSING NOTE
Patient Information     Patient Name MRN Ava Cardozo [id number] Female 1980      Nursing Note by Dang Dempsey at 2018 11:00 AM     Author:  Dang Dempsey Service:  (none) Author Type:  (none)     Filed:  2018 11:44 AM Encounter Date:  2018 Status:  Signed     :  Dagn Dempsey            Patient is here for medication management .  Dang Dempsey LPN ....................2018  11:08 AM

## 2018-02-12 NOTE — PROGRESS NOTES
Patient Information     Patient Name MRN Sex Ava Lott [id number] Female 1980      Progress Notes by Sirisha Sarah MD at 2018 11:00 AM     Author:  Sirisha Sarah MD Service:  (none) Author Type:  Physician     Filed:  2018  8:26 PM Encounter Date:  2018 Status:  Signed     :  Sirisha Sarah MD (Physician)            SUBJECTIVE:    Ava Mendoza is a 37 y.o. female who presents for follow up of her ADD. She wonders if she would be able to switch to a longer acting form of methylphenidate.  She feels that it wears off too quickly and has has had to take up to 4 doses per day on some days.  In the evenings, she needs to be able to help her kids with their homework.  She had been on Adderall previously, which did not work well for her.  Some of her stress has settled down somewhat.    HPI  I personally reviewed medications/allergies/history listed below:      No Known Allergies,   Family History       Problem   Relation Age of Onset     Diabetes  Father      hyperlipidemia, heart disease on his side of family.       Diabetes  Brother      Heart Disease  Paternal Grandfather      Good Health  Mother      Good Health  Brother      Good Health  Sister    ,   Current Outpatient Prescriptions on File Prior to Visit       Medication  Sig Dispense Refill     hyoscyamine (LEVSIN) 0.125 mg tablet Take 1 tablet by mouth every 4 hours if needed for Other (Specify) (abdominal pain). 30 tablet 2     multivitamin (MVI) tablet Take 1 tablet by mouth once daily.  0     nitrofurantoin macrocrystals/monohydrate (MACROBID) 100 mg capsule Take 1 capsule by mouth once daily. 30 capsule 11     No current facility-administered medications on file prior to visit.    ,   Past Medical History:     Diagnosis  Date     History of pregnancy     , gest DM with first pregnancy, vaginal deliveries    ,   Patient Active Problem List     Diagnosis  Code     Recurrent UTI N39.0      "ADD (attention deficit disorder) F98.8     High risk medication use - signed 9/22/16 Z79.899    and   Past Surgical History:      Procedure  Laterality Date     INGUINAL HERNIA REPAIR BILATERAL      bilat inguinal hernia repair       NASAL SURGERY      nasal reconstruction       Social History     Social History        Marital status:       Spouse name: N/A     Number of children:  N/A     Years of education:  N/A     Occupational History      Not on file.     Social History Main Topics         Smoking status:  Never Smoker      Smokeless tobacco:  Never Used      Alcohol use  4.2 - 6.0 oz/week     7 - 10 Glasses of wine per week      Drug use:  No      Sexual activity:  Yes      Partners: Male      Birth control/ protection: Condom, OCP      Other Topics  Concern     Not on file      Social History Narrative     .  She is an .  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  She grew up in [place].  She and her  both went to [employer].  Moved to [place] from [place].      Mt -     Elayne - daughter - 4/17/2011    Dhruv - son - 1/4/2008                  REVIEW OF SYSTEMS:  Review of Systems   Psychiatric/Behavioral: Negative for depression and suicidal ideas.       OBJECTIVE:  /70 (Cuff Site: Right Arm, Position: Sitting, Cuff Size: Adult Regular)  Pulse 74  Ht 1.676 m (5' 6\")  Wt 64.9 kg (143 lb)  BMI 23.08 kg/m2    EXAM:   Physical Exam   Constitutional: She is well-developed, well-nourished, and in no distress.   HENT:   Head: Normocephalic.   Eyes: Pupils are equal, round, and reactive to light.   Neck: Normal range of motion. Neck supple. No thyromegaly present.   Cardiovascular: Normal rate, regular rhythm and normal heart sounds.    No murmur heard.  Lymphadenopathy:     She has no cervical adenopathy.   Psychiatric: Affect normal.       PHQ Depression Screening 10/3/2017 1/4/2018   Date of PHQ exam " (doc flow) - -   1. Lack of interest/pleasure 1 - Several days 0 - Not at all   2. Feeling down/depressed 1 - Several days 1 - Several days   PHQ-2 TOTAL SCORE 2 1   3. Trouble sleeping 0 - Not at all 0 - Not at all   4. Decreased energy 1 - Several days 1 - Several days   5. Appetite change 1 - Several days 1 - Several days   6. Feelings of failure 1 - Several days 1 - Several days   7. Trouble concentrating 2 - More than half the days 1 - Several days   8. Activity level 0 - Not at all 1 - Several days   9. Hurting yourself 0 - Not at all 0 - Not at all   PHQ-9 TOTAL SCORE 7 6   PHQ-9 Severity Level mild mild   Functional Impairment somewhat difficult somewhat difficult   Some recent data might be hidden       ERICKA-7 ANXIETY SCREENING 8/4/2017 1/4/2018   ERICKA date (doc flow) - -   Nervous, anxious 3 1   Cannot stop worrying 3 1   Worry about different things 3 1   Cannot relax 2 1   Feeling restless 0 0   Easily annoyed/irritated 1 1   Afraid of awful event 2 1   Score 14 6   Severity moderate anxiety mild anxiety   Some recent data might be hidden           ASSESSMENT/PLAN:    ICD-10-CM    1. Attention deficit disorder, unspecified hyperactivity presence F98.8 methylphenidate HCl (CONCERTA) 36 mg Extended-Release tablet      DISCONTINUED: methylphenidate HCl (CONCERTA) 36 mg Extended-Release tablet      DISCONTINUED: methylphenidate HCl (CONCERTA) 36 mg Extended-Release tablet        Plan:    1.  Stop immediate release methylphenidate.  Switch to Concerta 36 mg twice daily.  #60x3 refilled today.  Contract and toxassure both last completed on 10/5/17.  [facility] website printout reviewed and scanned.  If she decides to stay on this dose, will plan to sign updated contract at next visit in 3 months.  Sirisha Sarah MD ....................  1/4/2018   8:26 PM

## 2018-02-13 NOTE — TELEPHONE ENCOUNTER
Patient Information     Patient Name MRN Ava Cardozo [id number] Female 1980      Telephone Encounter by Sirisha Sarah MD at 2018 12:10 PM     Author:  Sirisha Sarah MD Service:  (none) Author Type:  Physician     Filed:  2018 12:11 PM Encounter Date:  2018 Status:  Signed     :  Sirisha Sarah MD (Physician)            The Concerta she is on is a long-acting form of ritalin (methylphenidate).  If she is thinking that this is not working for her, she would need to be seen in clinic and bring the remaining medications she has to trade for a new prescription.  Please assist her in making an appointment if needed.  Sirisha Sarah MD ....................  2018   12:11 PM

## 2018-02-13 NOTE — NURSING NOTE
Patient Information     Patient Name MRN Ava Cardozo [id number] Female 1980      Nursing Note by Sakina Paulino at 2018 10:30 AM     Author:  Sakina Paulino Service:  (none) Author Type:  NURS- Student Practical Nurse     Filed:  2018 10:49 AM Encounter Date:  2018 Status:  Signed     :  Sakina Paulino (NURS- Student Practical Nurse)            Patient presents with foul smelling urine for 4-5 days. Sakina Paulino LPN .............2018  10:39 AM

## 2018-02-13 NOTE — PROGRESS NOTES
Patient Information     Patient Name MRN Ava Cardozo [id number] Female 1980      Progress Notes by Liya Starks NP at 2018 10:30 AM     Author:  Liya Starks NP Service:  (none) Author Type:  PHYS- Nurse Practitioner     Filed:  2018 11:13 AM Encounter Date:  2018 Status:  Signed     :  Liya Starks NP (PHYS- Nurse Practitioner)            HPI:    Ava Mendoza is a 38 y.o. female who presents to clinic today for urinary concerns. Reports foul smelling urine for past few days. No burning, frequency or urgency. No fevers. No hematuria. No n/v. Hx of recurrent UTI's although no recent infections noted. Has Macrobid to use on demand but not using this over the past couple years.     Past Medical History:     Diagnosis  Date     History of pregnancy     , gest DM with first pregnancy, vaginal deliveries      Past Surgical History:      Procedure  Laterality Date     INGUINAL HERNIA REPAIR BILATERAL      bilat inguinal hernia repair       NASAL SURGERY      nasal reconstruction       Social History      Substance Use Topics        Smoking status:  Never Smoker     Smokeless tobacco:  Never Used     Alcohol use  4.2 - 6.0 oz/week      7 - 10 Glasses of wine per week       Current Outpatient Prescriptions       Medication  Sig Dispense Refill     hyoscyamine (LEVSIN) 0.125 mg tablet Take 1 tablet by mouth every 4 hours if needed for Other (Specify) (abdominal pain). 30 tablet 2     [START ON 3/2/2018] methylphenidate HCl (CONCERTA) 36 mg Extended-Release tablet Earliest Fill Date: 3/2/18 Take one by mouth twice daily. 60 tablet 0     multivitamin (MVI) tablet Take 1 tablet by mouth once daily.  0     nitrofurantoin macrocrystals/monohydrate (MACROBID) 100 mg capsule Take 1 capsule by mouth once daily. 30 capsule 11     No current facility-administered medications for this visit.      Medications have been reviewed by me and are current to the best of my knowledge  and ability.    No Known Allergies    ROS:  Pertinent positives and negatives are noted in HPI.    EXAM:  General appearance: well appearing female, in no acute distress  Respiratory: clear to auscultation bilaterally  Cardiac: RRR with no murmurs  Abdomen: soft, nontender, no CVAT  Psychological: normal affect, alert and pleasant  Lab:   Results for orders placed or performed in visit on 01/30/18      URINALYSIS W REFLEX MICROSCOPIC IF POSITIVE      Result  Value Ref Range    COLOR                     Yellow Yellow Color    CLARITY                   Clear Clear Clarity    SPECIFIC GRAVITY,URINE    1.025 1.010, 1.015, 1.020, 1.025                    PH,URINE                  6.5 6.0, 7.0, 8.0, 5.5, 6.5, 7.5, 8.5                    UROBILINOGEN,QUALITATIVE  Normal Normal EU/dl    PROTEIN, URINE Negative Negative mg/dL    GLUCOSE, URINE Negative Negative mg/dL    KETONES,URINE             Negative Negative mg/dL    BILIRUBIN,URINE           Negative Negative                    OCCULT BLOOD,URINE        Negative Negative                    NITRITE                   Positive (A) Negative                    LEUKOCYTE ESTERASE        Negative Negative                   URINALYSIS MICROSCOPIC      Result  Value Ref Range    RBC 0-2 0-2, None Seen /HPF    WBC 11-25 (A) 0-2, 3-5, None Seen /HPF    BACTERIA                  Moderate (A) None Seen, Rare, Occasional, Few Bacteria/HPF    EPITHELIAL CELLS          Moderate (A) None Seen, Few Epi/HPF    OTHER Mucus Present     RENAL EPITHELIAL CELLS Few None Seen, Few         ASSESSMENT/PLAN:    ICD-10-CM    1. Acute cystitis without hematuria N30.00 nitrofurantoin macrocrystals/monohydrate (MACROBID) 100 mg capsule      URINE CULTURE      URINE CULTURE   2. Urinary problem R39.89 URINALYSIS W REFLEX MICROSCOPIC IF POSITIVE      URINALYSIS W REFLEX MICROSCOPIC IF POSITIVE      URINALYSIS MICROSCOPIC      URINALYSIS MICROSCOPIC   3. Recurrent UTI N39.0    UA with nitrates and  moderate bacteria. Tx with macrobid x7 days. Uc pending. Reviewed need to complete all antibiotics. Discussed typical course of illness, symptomatic treatment and when to return to clinic. Patient in agreement with plan and all questions were answered.     Patient Instructions   Antibiotics per order.  Drink plenty of fluids.   Return to clinic if any fevers, vomiting, or flank pain develops as this may be a sign the infection has moved to your kidney's.  We have initiated a urine culture. If any changes are needed in your antibiotics, we will notify you when the results have returned.

## 2018-02-13 NOTE — PATIENT INSTRUCTIONS
Patient Information     Patient Name MRN Ava Cardozo [id number] Female 1980      Patient Instructions by Liya Starks NP at 2018 10:30 AM     Author:  Liya Starks NP Service:  (none) Author Type:  PHYS- Nurse Practitioner     Filed:  2018 11:06 AM Encounter Date:  2018 Status:  Signed     :  Liya Starks NP (PHYS- Nurse Practitioner)            Antibiotics per order.  Drink plenty of fluids.   Return to clinic if any fevers, vomiting, or flank pain develops as this may be a sign the infection has moved to your kidney's.  We have initiated a urine culture. If any changes are needed in your antibiotics, we will notify you when the results have returned.

## 2018-02-16 ENCOUNTER — TELEPHONE (OUTPATIENT)
Dept: FAMILY MEDICINE | Facility: OTHER | Age: 38
End: 2018-02-16

## 2018-02-16 ENCOUNTER — MYC MEDICAL ADVICE (OUTPATIENT)
Dept: FAMILY MEDICINE | Facility: OTHER | Age: 38
End: 2018-02-16

## 2018-02-16 ENCOUNTER — OFFICE VISIT (OUTPATIENT)
Dept: FAMILY MEDICINE | Facility: OTHER | Age: 38
End: 2018-02-16
Attending: FAMILY MEDICINE
Payer: COMMERCIAL

## 2018-02-16 VITALS
SYSTOLIC BLOOD PRESSURE: 122 MMHG | BODY MASS INDEX: 22.18 KG/M2 | HEART RATE: 74 BPM | HEIGHT: 66 IN | DIASTOLIC BLOOD PRESSURE: 80 MMHG | WEIGHT: 138 LBS

## 2018-02-16 DIAGNOSIS — F90.2 ATTENTION DEFICIT HYPERACTIVITY DISORDER (ADHD), COMBINED TYPE: Primary | ICD-10-CM

## 2018-02-16 PROCEDURE — 99213 OFFICE O/P EST LOW 20 MIN: CPT | Performed by: FAMILY MEDICINE

## 2018-02-16 RX ORDER — METHYLPHENIDATE HYDROCHLORIDE 10 MG/1
TABLET ORAL
Qty: 90 TABLET | Refills: 0 | Status: SHIPPED | OUTPATIENT
Start: 2018-02-16 | End: 2018-03-02

## 2018-02-16 RX ORDER — METHYLPHENIDATE HYDROCHLORIDE 20 MG/1
TABLET ORAL
Qty: 90 TABLET | Refills: 0 | Status: SHIPPED | OUTPATIENT
Start: 2018-02-16 | End: 2018-03-02

## 2018-02-16 ASSESSMENT — ANXIETY QUESTIONNAIRES
6. BECOMING EASILY ANNOYED OR IRRITABLE: SEVERAL DAYS
4. TROUBLE RELAXING: MORE THAN HALF THE DAYS
7. FEELING AFRAID AS IF SOMETHING AWFUL MIGHT HAPPEN: SEVERAL DAYS
3. WORRYING TOO MUCH ABOUT DIFFERENT THINGS: NEARLY EVERY DAY
1. FEELING NERVOUS, ANXIOUS, OR ON EDGE: MORE THAN HALF THE DAYS
5. BEING SO RESTLESS THAT IT IS HARD TO SIT STILL: MORE THAN HALF THE DAYS
GAD7 TOTAL SCORE: 14
2. NOT BEING ABLE TO STOP OR CONTROL WORRYING: NEARLY EVERY DAY
IF YOU CHECKED OFF ANY PROBLEMS ON THIS QUESTIONNAIRE, HOW DIFFICULT HAVE THESE PROBLEMS MADE IT FOR YOU TO DO YOUR WORK, TAKE CARE OF THINGS AT HOME, OR GET ALONG WITH OTHER PEOPLE: SOMEWHAT DIFFICULT

## 2018-02-16 NOTE — PROGRESS NOTES
SUBJECTIVE:   Ava Mendoza is a 38 year old female who presents to clinic today for the following health issues:    ADD.  She has been most recently using Concerta.  She does not feel that this was working as well as the methylphenidate immediate release that she was using previously.  She is going to be on vacation next week and would like to go back on what she was on before.  She is going to be traveling with her to children she is concerned that to [place].  With having to navigate an uncertain city alone driving that she will have difficulty if she is not back on her previous methylphenidate.  She does have another appointment in early March for her usual med refill.  She would like to consider other options for other extended release medication for her ADHD at that time.  For now, she would like to go back to the methylphenidate dosing she was on previously.      HPI    Patient Active Problem List    Diagnosis Date Noted     High risk medication use 2016     Priority: Medium     Overview:   Methylphenidate 20 mg BID #60/mo       ADD (attention deficit disorder) 2015     Priority: Medium     Recurrent UTI 2013     Priority: Medium     Past Medical History:   Diagnosis Date     Personal history of other medical treatment (CODE)     , gest DM with first pregnancy, vaginal deliveries      Past Surgical History:   Procedure Laterality Date     HERNIORRHAPHY INGUINAL BILATERAL      bilat inguinal hernia repair     OTHER SURGICAL HISTORY      92018,NASAL SURGERY,nasal reconstruction     Family History   Problem Relation Age of Onset     DIABETES Father      Diabetes,hyperlipidemia, heart disease on his side of family.     DIABETES Brother      Diabetes     HEART DISEASE Paternal Grandfather      Heart Disease     Family History Negative Mother      Good Health     Family History Negative Brother      Good Health     Family History Negative Sister      Good Health     Social History  "  Substance Use Topics     Smoking status: Never Smoker     Smokeless tobacco: Never Used     Alcohol use 4.2 - 6.0 oz/week     Social History     Social History Narrative    .  She is an .  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  She grew up in [place].  She and her  both went to [employer].  Moved to [place] from [place].    Mt -       Elayne - daughter - 4/17/2011  Dhruv - son - 1/4/2008     Current Outpatient Prescriptions   Medication Sig Dispense Refill     methylphenidate (RITALIN) 10 MG tablet Take 10 mg with 20 mg to equal 30 mg by mouth 3x/day. 90 tablet 0     methylphenidate (RITALIN) 20 MG tablet Take 20 mg with 10 mg to equal 30 mg by mouth 3x/day. 90 tablet 0     hyoscyamine (ANASPAZ/LEVSIN) 0.125 MG tablet Take 1 tablet by mouth every 4 hours as needed For abdominal pain       Multiple Vitamin (MULTI-VITAMINS) TABS Take 1 tablet by mouth daily       nitroFURantoin, macrocrystal-monohydrate, (MACROBID) 100 MG capsule Take 1 capsule by mouth daily       Not on File    Review of Systems   Constitutional: Negative for chills and fever.   Respiratory: Negative for cough.    Psychiatric/Behavioral: The patient is nervous/anxious.         OBJECTIVE:     /80 (BP Location: Right arm, Patient Position: Sitting, Cuff Size: Adult Regular)  Pulse 74  Ht 5' 6\" (1.676 m)  Wt 138 lb (62.6 kg)  LMP 01/22/2018  BMI 22.27 kg/m2  Body mass index is 22.27 kg/(m^2).  Physical Exam   Constitutional: She appears well-developed.   HENT:   Head: Normocephalic.   Eyes: Pupils are equal, round, and reactive to light.   Neck: Normal range of motion. Neck supple.   Cardiovascular: Normal rate, regular rhythm and normal heart sounds.    No murmur heard.  Pulmonary/Chest: Effort normal and breath sounds normal. No respiratory distress. She has no wheezes. She has no rales.   Psychiatric: She has a normal mood and affect. "     ERICKA-7 SCORE 8/4/2017 1/4/2018 2/16/2018   Total Score 14 6 14       PHQ-9 SCORE 10/3/2017 1/4/2018 2/16/2018   Total Score 7 6 10       Diagnostic Test Results:  none     ASSESSMENT/PLAN:         1. Attention deficit hyperactivity disorder (ADHD), combined type  She brought in the remaining Concerta tablets that she had not used for our disposal.  This was done in pharmacy.  She also returned her last prescription for Concerta that she had not yet filled, which was shredded.  Numerous description for both methylphenidate 10 and 20 mg tablets #90 of each given.  She takes 10+20 mg to equal 30 mg by mouth 3 times daily.  I only gave her enough for 1 month.  She will be returning to clinic in early March.  At that time we will discuss other options.  She would likely also benefit from addition of another SSRI for her anxiety symptoms.  [facility] website printout reviewed and scanned today.  - methylphenidate (RITALIN) 10 MG tablet; Take 10 mg with 20 mg to equal 30 mg by mouth 3x/day.  Dispense: 90 tablet; Refill: 0  - methylphenidate (RITALIN) 20 MG tablet; Take 20 mg with 10 mg to equal 30 mg by mouth 3x/day.  Dispense: 90 tablet; Refill: 0      Sirisha Sarah MD  [facility] AND [facility]

## 2018-02-16 NOTE — MR AVS SNAPSHOT
After Visit Summary   2/16/2018    Ava Mendoza    MRN: [id number]           Patient Information     Date Of Birth          1980        Visit Information        Provider Department      2/16/2018 12:45 PM Sirisha Sarah MD [facility]        Today's Diagnoses     Attention deficit hyperactivity disorder (ADHD), combined type    -  1       Follow-ups after your visit        Your next 10 appointments already scheduled     Mar 02, 2018  9:00 AM CST   Office Visit with Sirisha Sarah MD   [facility] ([facility])    [address]  [place] [address] [phone]           Bring a current list of meds and any records pertaining to this visit. For Physicals, please bring immunization records and any forms needing to be filled out. Please arrive 10 minutes early to complete paperwork.            Apr 03, 2018  9:30 AM CDT   Office Visit with Sirisha Sarah MD   [facility] ([facility])    [address]  [place] [address] [phone]           Bring a current list of meds and any records pertaining to this visit. For Physicals, please bring immunization records and any forms needing to be filled out. Please arrive 10 minutes early to complete paperwork.              Who to contact     If you have questions or need follow up information about today's clinic visit or your schedule please contact [facility] directly at [phone].  Normal or non-critical lab and imaging results will be communicated to you by MyChart, letter or phone within 4 business days after the clinic has received the results. If you do not hear from us within 7 days, please contact the clinic through MyChart or phone. If you have a critical or abnormal lab result, we will notify you by phone as soon as possible.  Submit refill requests  "through [employer] or call your pharmacy and they will forward the refill request to us. Please allow 3 business days for your refill to be completed.          Additional Information About Your Visit        [employer] Information     [employer] gives you secure access to your electronic health record. If you see a primary care provider, you can also send messages to your care team and make appointments. If you have questions, please call your primary care clinic.  If you do not have a primary care provider, please call [phone] and they will assist you.        Care EveryWhere ID     This is your Care EveryWhere ID. This could be used by other organizations to access your [facility] medical records  [id number]        Your Vitals Were     Pulse Height Last Period BMI (Body Mass Index)          74 5' 6\" (1.676 m) 01/22/2018 22.27 kg/m2         Blood Pressure from Last 3 Encounters:   02/16/18 122/80   01/30/18 120/80   01/04/18 120/70    Weight from Last 3 Encounters:   02/16/18 138 lb (62.6 kg)   01/30/18 140 lb 12.8 oz (63.9 kg)   01/04/18 143 lb (64.9 kg)              Today, you had the following     No orders found for display         Today's Medication Changes          These changes are accurate as of 2/16/18 11:59 PM.  If you have any questions, ask your nurse or doctor.               Start taking these medicines.        Dose/Directions    * methylphenidate 10 MG tablet   Commonly known as:  RITALIN   Used for:  Attention deficit hyperactivity disorder (ADHD), combined type   Started by:  Sirisha Sarah MD        Take 10 mg with 20 mg to equal 30 mg by mouth 3x/day.   Quantity:  90 tablet   Refills:  0       * methylphenidate 20 MG tablet   Commonly known as:  RITALIN   Used for:  Attention deficit hyperactivity disorder (ADHD), combined type   Started by:  Sirisha Sarah MD        Take 20 mg with 10 mg to equal 30 mg by mouth 3x/day.   Quantity:  90 tablet   Refills:  0       * Notice:  This " list has 2 medication(s) that are the same as other medications prescribed for you. Read the directions carefully, and ask your doctor or other care provider to review them with you.         Where to get your medicines      Some of these will need a paper prescription and others can be bought over the counter.  Ask your nurse if you have questions.     Bring a paper prescription for each of these medications     methylphenidate 10 MG tablet    methylphenidate 20 MG tablet                Primary Care Provider Office Phone # Fax #    Sirisha Jennifer Sarah [phone]       [address] [facility] [address]        Equal Access to Services     [facility]: Hadii cata cantor hadasho Soomaali, waaxda luqadaha, qaybta kaalmasmith ibarra, grady chawla . So [facility] [phone].    ATENCIÓN: Si habla español, tiene a anthony disposición servicios gratuitos de asistencia lingüística. [facility] [phone].    We comply with applicable federal civil rights laws and [place] laws. We do not discriminate on the basis of race, color, national origin, age, disability, sex, sexual orientation, or gender identity.            Thank you!     Thank you for choosing [facility] AND [facility]  for your care. Our goal is always to provide you with excellent care. Hearing back from our patients is one way we can continue to improve our services. Please take a few minutes to complete the written survey that you may receive in the mail after your visit with us. Thank you!             Your Updated Medication List - Protect others around you: Learn how to safely use, store and throw away your medicines at www.disposemymeds.org.          This list is accurate as of 2/16/18 11:59 PM.  Always use your most recent med list.                   Brand Name Dispense Instructions for use Diagnosis    hyoscyamine 0.125 MG tablet    ANASPAZ/LEVSIN     Take 1 tablet by mouth every 4 hours as needed  For abdominal pain        * methylphenidate 10 MG tablet    RITALIN    90 tablet    Take 10 mg with 20 mg to equal 30 mg by mouth 3x/day.    Attention deficit hyperactivity disorder (ADHD), combined type       * methylphenidate 20 MG tablet    RITALIN    90 tablet    Take 20 mg with 10 mg to equal 30 mg by mouth 3x/day.    Attention deficit hyperactivity disorder (ADHD), combined type       MULTI-VITAMINS Tabs      Take 1 tablet by mouth daily        nitroFURantoin (macrocrystal-monohydrate) 100 MG capsule    MACROBID     Take 1 capsule by mouth daily        * Notice:  This list has 2 medication(s) that are the same as other medications prescribed for you. Read the directions carefully, and ask your doctor or other care provider to review them with you.

## 2018-02-16 NOTE — TELEPHONE ENCOUNTER
[facility]- Patient requesting to go back to previous medication. Patient requesting a work in appointment for today, if possible. If not, [facility] They are leaving on a trip Monday 2/19/18 and would like the medication changed prior.  Please call.    Elise Dutton on 2/16/2018 at 9:38 AM

## 2018-02-16 NOTE — TELEPHONE ENCOUNTER
Please call - I can see her at 12:45 if she could make it by then.  Please have her bring unfilled prescriptions to [facility].  Sirisha Sarah MD on 2/16/2018 at 11:46 AM

## 2018-02-17 ASSESSMENT — PATIENT HEALTH QUESTIONNAIRE - PHQ9: SUM OF ALL RESPONSES TO PHQ QUESTIONS 1-9: 10

## 2018-02-17 ASSESSMENT — ANXIETY QUESTIONNAIRES: GAD7 TOTAL SCORE: 14

## 2018-02-18 ASSESSMENT — ENCOUNTER SYMPTOMS
COUGH: 0
NERVOUS/ANXIOUS: 1
CHILLS: 0
FEVER: 0

## 2018-03-02 ENCOUNTER — OFFICE VISIT (OUTPATIENT)
Dept: FAMILY MEDICINE | Facility: OTHER | Age: 38
End: 2018-03-02
Attending: FAMILY MEDICINE
Payer: COMMERCIAL

## 2018-03-02 VITALS
HEIGHT: 66 IN | BODY MASS INDEX: 22.5 KG/M2 | HEART RATE: 74 BPM | DIASTOLIC BLOOD PRESSURE: 70 MMHG | SYSTOLIC BLOOD PRESSURE: 120 MMHG | WEIGHT: 140 LBS

## 2018-03-02 DIAGNOSIS — H10.9 CONJUNCTIVITIS OF BOTH EYES, UNSPECIFIED CONJUNCTIVITIS TYPE: ICD-10-CM

## 2018-03-02 DIAGNOSIS — F90.2 ATTENTION DEFICIT HYPERACTIVITY DISORDER (ADHD), COMBINED TYPE: Primary | ICD-10-CM

## 2018-03-02 DIAGNOSIS — F32.81 PMDD (PREMENSTRUAL DYSPHORIC DISORDER): ICD-10-CM

## 2018-03-02 PROCEDURE — 99213 OFFICE O/P EST LOW 20 MIN: CPT | Performed by: FAMILY MEDICINE

## 2018-03-02 RX ORDER — GENTAMICIN SULFATE 3 MG/ML
1 SOLUTION/ DROPS OPHTHALMIC EVERY 4 HOURS
Qty: 5 ML | Refills: 0 | Status: SHIPPED | OUTPATIENT
Start: 2018-03-02 | End: 2018-03-09

## 2018-03-02 RX ORDER — METHYLPHENIDATE HYDROCHLORIDE 20 MG/1
TABLET ORAL
Qty: 90 TABLET | Refills: 0 | Status: SHIPPED | OUTPATIENT
Start: 2018-03-16 | End: 2018-04-03

## 2018-03-02 RX ORDER — METHYLPHENIDATE HYDROCHLORIDE 10 MG/1
TABLET ORAL
Qty: 90 TABLET | Refills: 0 | Status: SHIPPED | OUTPATIENT
Start: 2018-03-16 | End: 2018-04-03

## 2018-03-02 ASSESSMENT — ENCOUNTER SYMPTOMS
EYE ITCHING: 0
UNEXPECTED WEIGHT CHANGE: 0
SORE THROAT: 0
EYE REDNESS: 1
COUGH: 0
EYE PAIN: 0
RHINORRHEA: 0
EYE DISCHARGE: 1

## 2018-03-02 NOTE — PROGRESS NOTES
"  SUBJECTIVE:   Ava Mendoza is a 38 year old female who presents to clinic today for follow up.    She has been taking methylphenidate immediate release most recently for her ADHD.  She had been taking Concerta prior to going back to the immediate release.  She doesn't feel that the long acting version worked well for her, but the immediate release version makes her feel \"up & down\" throughout the day.      She also notes that she has been having a lot of irritability and anxiety mostly the week or so during her period.  Usually by the 2nd day of her period, she has felt better.  She has a history of anxiety and had been taking prozac in the past, but is not currently taking this.  She had been taking 20 mg daily previously.    For the past week, eyes have been burning and has had crusty drainage.  No runny nose, cough.  Has been using visine.  Doesn't use contacts.  Has pressure behind her eyes.  Hasn't been in any pools lately.      HPI    Patient Active Problem List    Diagnosis Date Noted     High risk medication use 2016     Priority: Medium     Overview:   Methylphenidate 20 mg BID #60/mo       ADD (attention deficit disorder) 2015     Priority: Medium     Recurrent UTI 2013     Priority: Medium     Past Medical History:   Diagnosis Date     Personal history of other medical treatment (CODE)     , gest DM with first pregnancy, vaginal deliveries      Past Surgical History:   Procedure Laterality Date     HERNIORRHAPHY INGUINAL BILATERAL      bilat inguinal hernia repair     OTHER SURGICAL HISTORY      76046,NASAL SURGERY,nasal reconstruction     Family History   Problem Relation Age of Onset     DIABETES Father      Diabetes,hyperlipidemia, heart disease on his side of family.     DIABETES Brother      Diabetes     HEART DISEASE Paternal Grandfather      Heart Disease     Family History Negative Mother      Good Health     Family History Negative Brother      Good Health     Family " "History Negative Sister      Good Health     Social History   Substance Use Topics     Smoking status: Never Smoker     Smokeless tobacco: Never Used     Alcohol use 4.2 - 6.0 oz/week     Social History     Social History Narrative    .  She is an .  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  She grew up in [place].  She and her  both went to [employer].  Moved to [place] from [place].    Mt -       Elayne - daughter - 4/17/2011  Dhruv - son - 1/4/2008     Current Outpatient Prescriptions   Medication Sig Dispense Refill     FLUoxetine (PROZAC) 20 MG capsule Take 1 capsule (20 mg) by mouth daily 30 capsule 3     [START ON 3/16/2018] methylphenidate (RITALIN) 10 MG tablet Take 10 mg with 20 mg to equal 30 mg by mouth 3x/day. 90 tablet 0     [START ON 3/16/2018] methylphenidate (RITALIN) 20 MG tablet Take 20 mg with 10 mg to equal 30 mg by mouth 3x/day. 90 tablet 0     gentamicin (GARAMYCIN) 0.3 % ophthalmic solution Apply 1 drop to eye every 4 hours for 7 days 5 mL 0     hyoscyamine (ANASPAZ/LEVSIN) 0.125 MG tablet Take 1 tablet by mouth every 4 hours as needed For abdominal pain       Multiple Vitamin (MULTI-VITAMINS) TABS Take 1 tablet by mouth daily       nitroFURantoin, macrocrystal-monohydrate, (MACROBID) 100 MG capsule Take 1 capsule by mouth daily       [DISCONTINUED] FLUoxetine (PROZAC) 20 MG capsule Take 20 mg by mouth daily  3     Not on File    Review of Systems   Constitutional: Negative for unexpected weight change.   HENT: Negative for ear pain, rhinorrhea, sneezing and sore throat.    Eyes: Positive for discharge and redness. Negative for pain and itching.   Respiratory: Negative for cough.         OBJECTIVE:     /70 (BP Location: Right arm, Patient Position: Sitting, Cuff Size: Adult Regular)  Pulse 74  Ht 5' 6\" (1.676 m)  Wt 140 lb (63.5 kg)  BMI 22.6 kg/m2  Body mass index is 22.6 " kg/(m^2).  Physical Exam   Constitutional: She appears well-developed.   HENT:   Head: Normocephalic.   Right Ear: External ear normal.   Left Ear: External ear normal.   Mouth/Throat: Oropharynx is clear and moist.   Eyes: Pupils are equal, round, and reactive to light.   Minimal injection of both eyes.   Neck: Normal range of motion. Neck supple. No thyromegaly present.   Cardiovascular: Normal rate, regular rhythm and normal heart sounds.    No murmur heard.  Pulmonary/Chest: Effort normal and breath sounds normal. No respiratory distress. She has no wheezes. She has no rales.   Lymphadenopathy:     She has no cervical adenopathy.   Psychiatric: She has a normal mood and affect.       PHQ-9 SCORE 1/4/2018 2/16/2018 3/2/2018   Total Score 6 10 7         Diagnostic Test Results:  none     ASSESSMENT/PLAN:       ICD-10-CM    1. Attention deficit hyperactivity disorder (ADHD), combined type F90.2 methylphenidate (RITALIN) 10 MG tablet     methylphenidate (RITALIN) 20 MG tablet   2. PMDD (premenstrual dysphoric disorder) F32.81    3. Conjunctivitis of both eyes, unspecified conjunctivitis type H10.9 gentamicin (GARAMYCIN) 0.3 % ophthalmic solution       1.  She would like to try treating the PMDD symptoms first to see if this helps prior to making any changes to her ADHD medications.  Therefore, one month of Methylphenidate 10 mg and 20 mg each were given.  toxassure and contract both last updated on 10/3/17.  [facility] website printout reviewed and scanned today.  Discussed that other long-acting options for stimulants would include Adderall XR (which she has already been on), Methylphenidate XR (which she has been on in the form of Concerta), Daytrana patch (methylphenidate patch), Focalin XR (dexmethylphenidate), Metadate CD (also long acting methylphenidate) as well as Ritalin LA (also methylphenidate).  Discussed that she might also consider seeing Psychiatry if we cannot find a medication that is helping her  sufficiently.  2.  She will take Fluoxetine 20 mg for days 15-28 of her cycle, through day #1 of her menses.  Follow up in 1 month.  3.  Prescription for gentamicin ophthalmic solution given.    Sirisha Sarah MD  [facility]

## 2018-03-02 NOTE — MR AVS SNAPSHOT
After Visit Summary   3/2/2018    Ava Mendoza    MRN: [id number]           Patient Information     Date Of Birth          1980        Visit Information        Provider Department      3/2/2018 9:00 AM Sirisha Sarah MD [facility]        Today's Diagnoses     Attention deficit hyperactivity disorder (ADHD), combined type    -  1    PMDD (premenstrual dysphoric disorder)        Conjunctivitis of both eyes, unspecified conjunctivitis type           Follow-ups after your visit        Your next 10 appointments already scheduled     Apr 03, 2018  9:30 AM CDT   Office Visit with Sirisha Sarah MD   [facility] ([facility])    [address]  [place] [address] [phone]           Bring a current list of meds and any records pertaining to this visit. For Physicals, please bring immunization records and any forms needing to be filled out. Please arrive 10 minutes early to complete paperwork.              Who to contact     If you have questions or need follow up information about today's clinic visit or your schedule please contact [facility] directly at [phone].  Normal or non-critical lab and imaging results will be communicated to you by [employer], letter or phone within 4 business days after the clinic has received the results. If you do not hear from us within 7 days, please contact the clinic through [employer] or phone. If you have a critical or abnormal lab result, we will notify you by phone as soon as possible.  Submit refill requests through [employer] or call your pharmacy and they will forward the refill request to us. Please allow 3 business days for your refill to be completed.          Additional Information About Your Visit        MyChart Information     [employer] gives you secure access to your electronic health record. If you see a primary care provider, you can  "also send messages to your care team and make appointments. If you have questions, please call your primary care clinic.  If you do not have a primary care provider, please call [phone] and they will assist you.        Care EveryWhere ID     This is your Care EveryWhere ID. This could be used by other organizations to access your [facility] medical records  [id number]        Your Vitals Were     Pulse Height BMI (Body Mass Index)             74 5' 6\" (1.676 m) 22.6 kg/m2          Blood Pressure from Last 3 Encounters:   03/02/18 120/70   02/16/18 122/80   01/30/18 120/80    Weight from Last 3 Encounters:   03/02/18 140 lb (63.5 kg)   02/16/18 138 lb (62.6 kg)   01/30/18 140 lb 12.8 oz (63.9 kg)              Today, you had the following     No orders found for display         Today's Medication Changes          These changes are accurate as of 3/2/18  1:30 PM.  If you have any questions, ask your nurse or doctor.               Start taking these medicines.        Dose/Directions    gentamicin 0.3 % ophthalmic solution   Commonly known as:  GARAMYCIN   Used for:  Conjunctivitis of both eyes, unspecified conjunctivitis type   Started by:  Sirisha Sarah MD        Dose:  1 drop   Apply 1 drop to eye every 4 hours for 7 days   Quantity:  5 mL   Refills:  0            Where to get your medicines      These medications were sent to [facility] [address] [place], [place] - [address]  [address], formerly [facility] [address]     Phone:  [phone]     gentamicin 0.3 % ophthalmic solution         Some of these will need a paper prescription and others can be bought over the counter.  Ask your nurse if you have questions.     Bring a paper prescription for each of these medications     methylphenidate 10 MG tablet    methylphenidate 20 MG tablet                Primary Care Provider Office Phone # Fax #    Sirisha Sarah [phone] [facility] [address]        Equal Access to Services     DANYQUAN LULU : Hadii aad ku hadkunaljuvenal Tang, warichardda gaurang, qabaljitkristina hicksvictor hugosmith ibarra, grady hosurjitdaniele mckeon. So [facility] [phone].    ATENCIÓN: Si habla español, tiene a anthony disposición servicios gratuitos de asistencia lingüística. Llame al [phone].    We comply with applicable federal civil rights laws and [place] laws. We do not discriminate on the basis of race, color, national origin, age, disability, sex, sexual orientation, or gender identity.            Thank you!     Thank you for choosing [facility] AND [facility]  for your care. Our goal is always to provide you with excellent care. Hearing back from our patients is one way we can continue to improve our services. Please take a few minutes to complete the written survey that you may receive in the mail after your visit with us. Thank you!             Your Updated Medication List - Protect others around you: Learn how to safely use, store and throw away your medicines at www.disposemymeds.org.          This list is accurate as of 3/2/18  1:30 PM.  Always use your most recent med list.                   Brand Name Dispense Instructions for use Diagnosis    FLUoxetine 20 MG capsule    PROzac    30 capsule    Take 1 capsule (20 mg) by mouth daily        gentamicin 0.3 % ophthalmic solution    GARAMYCIN    5 mL    Apply 1 drop to eye every 4 hours for 7 days    Conjunctivitis of both eyes, unspecified conjunctivitis type       hyoscyamine 0.125 MG tablet    ANASPAZ/LEVSIN     Take 1 tablet by mouth every 4 hours as needed For abdominal pain        * methylphenidate 10 MG tablet   Start taking on:  3/16/2018    RITALIN    90 tablet    Take 10 mg with 20 mg to equal 30 mg by mouth 3x/day.    Attention deficit hyperactivity disorder (ADHD), combined type       * methylphenidate 20 MG tablet   Start taking on:  3/16/2018    RITALIN    90 tablet    Take 20  mg with 10 mg to equal 30 mg by mouth 3x/day.    Attention deficit hyperactivity disorder (ADHD), combined type       MULTI-VITAMINS Tabs      Take 1 tablet by mouth daily        nitroFURantoin (macrocrystal-monohydrate) 100 MG capsule    MACROBID     Take 1 capsule by mouth daily        * Notice:  This list has 2 medication(s) that are the same as other medications prescribed for you. Read the directions carefully, and ask your doctor or other care provider to review them with you.

## 2018-03-02 NOTE — NURSING NOTE
Patient is here for medication management .  Dang Dempsey LPN ....................3/2/2018  9:02 AM

## 2018-03-03 ASSESSMENT — PATIENT HEALTH QUESTIONNAIRE - PHQ9: SUM OF ALL RESPONSES TO PHQ QUESTIONS 1-9: 7

## 2018-04-03 ENCOUNTER — OFFICE VISIT (OUTPATIENT)
Dept: FAMILY MEDICINE | Facility: OTHER | Age: 38
End: 2018-04-03
Attending: FAMILY MEDICINE
Payer: COMMERCIAL

## 2018-04-03 VITALS
WEIGHT: 142 LBS | SYSTOLIC BLOOD PRESSURE: 120 MMHG | BODY MASS INDEX: 22.82 KG/M2 | DIASTOLIC BLOOD PRESSURE: 80 MMHG | HEIGHT: 66 IN | HEART RATE: 77 BPM

## 2018-04-03 DIAGNOSIS — F90.2 ATTENTION DEFICIT HYPERACTIVITY DISORDER (ADHD), COMBINED TYPE: Primary | ICD-10-CM

## 2018-04-03 DIAGNOSIS — F32.81 PMDD (PREMENSTRUAL DYSPHORIC DISORDER): ICD-10-CM

## 2018-04-03 DIAGNOSIS — F41.9 ANXIETY: ICD-10-CM

## 2018-04-03 PROCEDURE — 99213 OFFICE O/P EST LOW 20 MIN: CPT | Performed by: FAMILY MEDICINE

## 2018-04-03 RX ORDER — METHYLPHENIDATE HYDROCHLORIDE 10 MG/1
TABLET ORAL
Qty: 90 TABLET | Refills: 0 | Status: SHIPPED | OUTPATIENT
Start: 2018-05-01

## 2018-04-03 RX ORDER — METHYLPHENIDATE HYDROCHLORIDE 20 MG/1
TABLET ORAL
Qty: 90 TABLET | Refills: 0 | Status: SHIPPED | OUTPATIENT
Start: 2018-04-03 | End: 2018-04-03

## 2018-04-03 RX ORDER — METHYLPHENIDATE HYDROCHLORIDE 10 MG/1
TABLET ORAL
Qty: 90 TABLET | Refills: 0 | Status: SHIPPED | OUTPATIENT
Start: 2018-05-29

## 2018-04-03 RX ORDER — METHYLPHENIDATE HYDROCHLORIDE 20 MG/1
TABLET ORAL
Qty: 90 TABLET | Refills: 0 | Status: SHIPPED | OUTPATIENT
Start: 2018-05-29

## 2018-04-03 RX ORDER — METHYLPHENIDATE HYDROCHLORIDE 10 MG/1
TABLET ORAL
Qty: 90 TABLET | Refills: 0 | Status: SHIPPED | OUTPATIENT
Start: 2018-04-03 | End: 2018-04-03

## 2018-04-03 RX ORDER — METHYLPHENIDATE HYDROCHLORIDE 20 MG/1
TABLET ORAL
Qty: 90 TABLET | Refills: 0 | Status: SHIPPED | OUTPATIENT
Start: 2018-05-01

## 2018-04-03 NOTE — NURSING NOTE
Patient is here for medication management .  Dang Dempsey LPN ....................4/3/2018  9:41 AM

## 2018-04-03 NOTE — MR AVS SNAPSHOT
"              After Visit Summary   4/3/2018    Ava Mendoza    MRN: [id number]           Patient Information     Date Of Birth          1980        Visit Information        Provider Department      4/3/2018 9:30 AM Sirisha Sarah MD [facility]        Today's Diagnoses     Attention deficit hyperactivity disorder (ADHD), combined type           Follow-ups after your visit        Who to contact     If you have questions or need follow up information about today's clinic visit or your schedule please contact [facility] directly at [phone].  Normal or non-critical lab and imaging results will be communicated to you by [employer], letter or phone within 4 business days after the clinic has received the results. If you do not hear from us within 7 days, please contact the clinic through [employer] or phone. If you have a critical or abnormal lab result, we will notify you by phone as soon as possible.  Submit refill requests through [employer] or call your pharmacy and they will forward the refill request to us. Please allow 3 business days for your refill to be completed.          Additional Information About Your Visit        MyChart Information     [employer] gives you secure access to your electronic health record. If you see a primary care provider, you can also send messages to your care team and make appointments. If you have questions, please call your primary care clinic.  If you do not have a primary care provider, please call [phone] and they will assist you.        Care EveryWhere ID     This is your Care EveryWhere ID. This could be used by other organizations to access your [place] medical records  [id number]        Your Vitals Were     Pulse Height BMI (Body Mass Index)             77 5' 6\" (1.676 m) 22.92 kg/m2          Blood Pressure from Last 3 Encounters:   04/03/18 120/80   03/02/18 120/70   02/16/18 122/80    Weight from Last 3 " Encounters:   04/03/18 142 lb (64.4 kg)   03/02/18 140 lb (63.5 kg)   02/16/18 138 lb (62.6 kg)              Today, you had the following     No orders found for display         Today's Medication Changes          These changes are accurate as of 4/3/18  9:54 AM.  If you have any questions, ask your nurse or doctor.               Start taking these medicines.        Dose/Directions    * methylphenidate 10 MG tablet   Commonly known as:  RITALIN   Used for:  Attention deficit hyperactivity disorder (ADHD), combined type   Started by:  Sirisha Sarah MD        Start taking on:  5/1/2018   Take 10 mg with 20 mg to equal 30 mg by mouth 3x/day.   Quantity:  90 tablet   Refills:  0       * methylphenidate 20 MG tablet   Commonly known as:  RITALIN   Used for:  Attention deficit hyperactivity disorder (ADHD), combined type   Started by:  Siirsha Sarah MD        Start taking on:  5/1/2018   Take 20 mg with 10 mg to equal 30 mg by mouth 3x/day.   Quantity:  90 tablet   Refills:  0       * methylphenidate 10 MG tablet   Commonly known as:  RITALIN   Used for:  Attention deficit hyperactivity disorder (ADHD), combined type   Started by:  Sirisha Sarah MD        Start taking on:  5/29/2018   Take 10 mg with 20 mg to equal 30 mg by mouth 3x/day.   Quantity:  90 tablet   Refills:  0       * methylphenidate 20 MG tablet   Commonly known as:  RITALIN   Used for:  Attention deficit hyperactivity disorder (ADHD), combined type   Started by:  Sirisha Sarah MD        Start taking on:  5/29/2018   Take 20 mg with 10 mg to equal 30 mg by mouth 3x/day.   Quantity:  90 tablet   Refills:  0       * Notice:  This list has 4 medication(s) that are the same as other medications prescribed for you. Read the directions carefully, and ask your doctor or other care provider to review them with you.         Where to get your medicines      Some of these will need a paper prescription and others can be  bought over the counter.  Ask your nurse if you have questions.     Bring a paper prescription for each of these medications     methylphenidate 10 MG tablet    methylphenidate 10 MG tablet    methylphenidate 20 MG tablet    methylphenidate 20 MG tablet                Primary Care Provider Office Phone # Fax #    Sirisha Jennifer Sarah [phone] [address]   [facility] [address]        Equal Access to Services     [facility] : Hadii aad ku hadasho Soomaali, waaxda luqadaha, qaybta kaalmada adeegyada, waxay idiin hayjuan josén adeeg khsurjitdaniele chawla . So [facility] [phone].    ATENCIÓN: Si aniket swain, tiene a anthony disposición servicios gratuitos de asistencia lingüística. Llame al [phone].    We comply with applicable federal civil rights laws and [place] laws. We do not discriminate on the basis of race, color, national origin, age, disability, sex, sexual orientation, or gender identity.            Thank you!     Thank you for choosing [facility] AND [facility]  for your care. Our goal is always to provide you with excellent care. Hearing back from our patients is one way we can continue to improve our services. Please take a few minutes to complete the written survey that you may receive in the mail after your visit with us. Thank you!             Your Updated Medication List - Protect others around you: Learn how to safely use, store and throw away your medicines at www.disposemymeds.org.          This list is accurate as of 4/3/18  9:54 AM.  Always use your most recent med list.                   Brand Name Dispense Instructions for use Diagnosis    FLUoxetine 20 MG capsule    PROzac    30 capsule    Take 1 capsule (20 mg) by mouth daily        hyoscyamine 0.125 MG tablet    ANASPAZ/LEVSIN     Take 1 tablet by mouth every 4 hours as needed For abdominal pain        * methylphenidate 10 MG tablet   Start taking on:  5/1/2018    RITALIN    90 tablet    Take 10 mg with  20 mg to equal 30 mg by mouth 3x/day.    Attention deficit hyperactivity disorder (ADHD), combined type       * methylphenidate 20 MG tablet   Start taking on:  5/1/2018    RITALIN    90 tablet    Take 20 mg with 10 mg to equal 30 mg by mouth 3x/day.    Attention deficit hyperactivity disorder (ADHD), combined type       * methylphenidate 10 MG tablet   Start taking on:  5/29/2018    RITALIN    90 tablet    Take 10 mg with 20 mg to equal 30 mg by mouth 3x/day.    Attention deficit hyperactivity disorder (ADHD), combined type       * methylphenidate 20 MG tablet   Start taking on:  5/29/2018    RITALIN    90 tablet    Take 20 mg with 10 mg to equal 30 mg by mouth 3x/day.    Attention deficit hyperactivity disorder (ADHD), combined type       MULTI-VITAMINS Tabs      Take 1 tablet by mouth daily        nitroFURantoin (macrocrystal-monohydrate) 100 MG capsule    MACROBID     Take 1 capsule by mouth daily        * Notice:  This list has 4 medication(s) that are the same as other medications prescribed for you. Read the directions carefully, and ask your doctor or other care provider to review them with you.

## 2018-04-03 NOTE — PROGRESS NOTES
SUBJECTIVE:   Ava Mendoza is a 38 year old female who presents to clinic today for follow up of her adult ADD as well as premenstrual dysphoric disorder and anxiety.  She has gone back to the short acting Ritalin 30 mg 3 times daily (combination of 20+10 mg tablets).  She feels that although it is inconvenient to take this medication 3 times a day, it is managing her symptoms better than the long-acting Concerta was previously.  Since she is doing well at this point, she prefers to leave well enough alone and stay on her current dosing.    Since I saw her last, she has restarted fluoxetine primarily to manage some premenstrual dysphoric symptoms.  She has been taking it for the last 2 weeks of her menstrual cycle through the first day of her period.  She feels that this is helped her mood immensely during this time.  She prefers to stay on this for now.    HPI      Patient Active Problem List    Diagnosis Date Noted     PMDD (premenstrual dysphoric disorder) 2018     Priority: Medium     High risk medication use 2016     Priority: Medium     Overview:   Methylphenidate 20 mg BID #60/mo       ADD (attention deficit disorder) 2015     Priority: Medium     Recurrent UTI 2013     Priority: Medium     Past Medical History:   Diagnosis Date     Personal history of other medical treatment (CODE)     , gest DM with first pregnancy, vaginal deliveries      Past Surgical History:   Procedure Laterality Date     HERNIORRHAPHY INGUINAL BILATERAL      bilat inguinal hernia repair     OTHER SURGICAL HISTORY      81271,NASAL SURGERY,nasal reconstruction     Family History   Problem Relation Age of Onset     DIABETES Father      Diabetes,hyperlipidemia, heart disease on his side of family.     DIABETES Brother      Diabetes     HEART DISEASE Paternal Grandfather      Heart Disease     Family History Negative Mother      Good Health     Family History Negative Brother      Good Health     Family  "History Negative Sister      Good Health     Social History   Substance Use Topics     Smoking status: Never Smoker     Smokeless tobacco: Never Used     Alcohol use 4.2 - 6.0 oz/week     Social History     Social History Narrative    .  She is an .  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  She grew up in [place].  She and her  both went to [facility] [facility].  Moved to [place] from [place].    Mt -       Elayne - daughter - 4/17/2011  Dhruv - son - 1/4/2008     Current Outpatient Prescriptions   Medication Sig Dispense Refill     [START ON 5/29/2018] methylphenidate (RITALIN) 10 MG tablet Take 10 mg with 20 mg to equal 30 mg by mouth 3x/day. 90 tablet 0     [START ON 5/29/2018] methylphenidate (RITALIN) 20 MG tablet Take 20 mg with 10 mg to equal 30 mg by mouth 3x/day. 90 tablet 0     [START ON 5/1/2018] methylphenidate (RITALIN) 10 MG tablet Take 10 mg with 20 mg to equal 30 mg by mouth 3x/day. 90 tablet 0     [START ON 5/1/2018] methylphenidate (RITALIN) 20 MG tablet Take 20 mg with 10 mg to equal 30 mg by mouth 3x/day. 90 tablet 0     FLUoxetine (PROZAC) 20 MG capsule Take 1 capsule (20 mg) by mouth daily 30 capsule 3     hyoscyamine (ANASPAZ/LEVSIN) 0.125 MG tablet Take 1 tablet by mouth every 4 hours as needed For abdominal pain       Multiple Vitamin (MULTI-VITAMINS) TABS Take 1 tablet by mouth daily       nitroFURantoin, macrocrystal-monohydrate, (MACROBID) 100 MG capsule Take 1 capsule by mouth daily       No Known Allergies    Review of Systems   Constitutional: Negative for appetite change and fever.   Respiratory: Negative for cough.    Psychiatric/Behavioral: Negative for mood changes.        OBJECTIVE:     /80 (BP Location: Right arm, Patient Position: Sitting, Cuff Size: Adult Regular)  Pulse 77  Ht 5' 6\" (1.676 m)  Wt 142 lb (64.4 kg)  BMI 22.92 kg/m2  Body mass index is 22.92 kg/(m^2).  Physical " Exam   Constitutional: She appears well-developed.   HENT:   Head: Normocephalic.   Eyes: Pupils are equal, round, and reactive to light.   Neck: Normal range of motion. Neck supple. No thyromegaly present.   Cardiovascular: Normal rate, regular rhythm and normal heart sounds.    No murmur heard.  Pulmonary/Chest: Effort normal and breath sounds normal. No respiratory distress. She has no wheezes. She has no rales.   Lymphadenopathy:     She has no cervical adenopathy.   Psychiatric: She has a normal mood and affect.       PHQ-9 SCORE 2/16/2018 3/2/2018 4/3/2018   Total Score 10 7 1       Diagnostic Test Results:  none     ASSESSMENT/PLAN:       ICD-10-CM    1. Attention deficit hyperactivity disorder (ADHD), combined type F90.2 methylphenidate (RITALIN) 10 MG tablet     methylphenidate (RITALIN) 20 MG tablet     methylphenidate (RITALIN) 10 MG tablet     methylphenidate (RITALIN) 20 MG tablet     DISCONTINUED: methylphenidate (RITALIN) 10 MG tablet     DISCONTINUED: methylphenidate (RITALIN) 20 MG tablet   2. PMDD (premenstrual dysphoric disorder) F32.81    3. Anxiety F41.9        1.  Methylphenidate 10 mg #90×3 and methylphenidate 20 mg #90×3 both refilled today.   website printout reviewed and scanned today.  [employer] in contract both last completed on 10/3/2017.  Follow-up in 3 months.  2.  She will continue using fluoxetine 20 mg daily for the last 2 weeks of her menstrual cycle as well as the first day of her period.  Follow-up if any worsening symptoms.  3.  Improved with management of her premenstrual dysphoric symptoms with use of fluoxetine as above.    Sirisha Sarah MD  [facility]

## 2018-04-04 PROBLEM — F32.81 PMDD (PREMENSTRUAL DYSPHORIC DISORDER): Status: ACTIVE | Noted: 2018-04-04

## 2018-04-04 ASSESSMENT — ENCOUNTER SYMPTOMS
APPETITE CHANGE: 0
COUGH: 0
FEVER: 0

## 2018-04-04 ASSESSMENT — PATIENT HEALTH QUESTIONNAIRE - PHQ9: SUM OF ALL RESPONSES TO PHQ QUESTIONS 1-9: 1

## 2020-03-11 ENCOUNTER — HEALTH MAINTENANCE LETTER (OUTPATIENT)
Age: 40
End: 2020-03-11

## 2020-12-27 ENCOUNTER — HEALTH MAINTENANCE LETTER (OUTPATIENT)
Age: 40
End: 2020-12-27

## 2021-04-25 ENCOUNTER — HEALTH MAINTENANCE LETTER (OUTPATIENT)
Age: 41
End: 2021-04-25

## 2021-10-09 ENCOUNTER — HEALTH MAINTENANCE LETTER (OUTPATIENT)
Age: 41
End: 2021-10-09

## 2022-05-21 ENCOUNTER — HEALTH MAINTENANCE LETTER (OUTPATIENT)
Age: 42
End: 2022-05-21

## 2022-09-17 ENCOUNTER — HEALTH MAINTENANCE LETTER (OUTPATIENT)
Age: 42
End: 2022-09-17

## 2023-06-04 ENCOUNTER — HEALTH MAINTENANCE LETTER (OUTPATIENT)
Age: 43
End: 2023-06-04

## 2024-02-24 ENCOUNTER — HEALTH MAINTENANCE LETTER (OUTPATIENT)
Age: 44
End: 2024-02-24